# Patient Record
Sex: FEMALE | Race: WHITE | Employment: UNEMPLOYED | URBAN - METROPOLITAN AREA
[De-identification: names, ages, dates, MRNs, and addresses within clinical notes are randomized per-mention and may not be internally consistent; named-entity substitution may affect disease eponyms.]

---

## 2021-03-01 ENCOUNTER — HOSPITAL ENCOUNTER (INPATIENT)
Age: 61
LOS: 2 days | Discharge: HOME | DRG: 189 | End: 2021-03-03
Attending: EMERGENCY MEDICINE | Admitting: INTERNAL MEDICINE
Payer: COMMERCIAL

## 2021-03-01 ENCOUNTER — APPOINTMENT (OUTPATIENT)
Dept: GENERAL RADIOLOGY | Age: 61
DRG: 189 | End: 2021-03-01
Payer: COMMERCIAL

## 2021-03-01 DIAGNOSIS — J45.901 EXACERBATION OF ASTHMA, UNSPECIFIED ASTHMA SEVERITY, UNSPECIFIED WHETHER PERSISTENT: Primary | ICD-10-CM

## 2021-03-01 DIAGNOSIS — J96.01 ACUTE RESPIRATORY FAILURE WITH HYPOXIA (HCC): ICD-10-CM

## 2021-03-01 PROBLEM — I10 HTN (HYPERTENSION): Status: ACTIVE | Noted: 2021-03-01

## 2021-03-01 PROBLEM — F32.A DEPRESSION: Status: ACTIVE | Noted: 2021-03-01

## 2021-03-01 PROBLEM — J45.909 ASTHMA: Status: ACTIVE | Noted: 2021-03-01

## 2021-03-01 LAB
A/G RATIO: 1.7 (ref 1.1–2.2)
ALBUMIN SERPL-MCNC: 4.5 G/DL (ref 3.4–5)
ALP BLD-CCNC: 82 U/L (ref 40–129)
ALT SERPL-CCNC: 28 U/L (ref 10–40)
ANION GAP SERPL CALCULATED.3IONS-SCNC: 10 MMOL/L (ref 3–16)
AST SERPL-CCNC: 25 U/L (ref 15–37)
BASE EXCESS VENOUS: 1.6 MMOL/L (ref -3–3)
BASOPHILS ABSOLUTE: 0.1 K/UL (ref 0–0.2)
BASOPHILS RELATIVE PERCENT: 1.4 %
BILIRUB SERPL-MCNC: <0.2 MG/DL (ref 0–1)
BUN BLDV-MCNC: 17 MG/DL (ref 7–20)
CALCIUM SERPL-MCNC: 9.4 MG/DL (ref 8.3–10.6)
CARBOXYHEMOGLOBIN: 1.8 % (ref 0–1.5)
CHLORIDE BLD-SCNC: 103 MMOL/L (ref 99–110)
CO2: 28 MMOL/L (ref 21–32)
CREAT SERPL-MCNC: 0.7 MG/DL (ref 0.6–1.2)
EKG ATRIAL RATE: 97 BPM
EKG DIAGNOSIS: NORMAL
EKG P AXIS: 53 DEGREES
EKG P-R INTERVAL: 148 MS
EKG Q-T INTERVAL: 358 MS
EKG QRS DURATION: 90 MS
EKG QTC CALCULATION (BAZETT): 454 MS
EKG R AXIS: 3 DEGREES
EKG T AXIS: 27 DEGREES
EKG VENTRICULAR RATE: 97 BPM
EOSINOPHILS ABSOLUTE: 1.2 K/UL (ref 0–0.6)
EOSINOPHILS RELATIVE PERCENT: 16.2 %
GFR AFRICAN AMERICAN: >60
GFR NON-AFRICAN AMERICAN: >60
GLOBULIN: 2.7 G/DL
GLUCOSE BLD-MCNC: 109 MG/DL (ref 70–99)
HCO3 VENOUS: 27.4 MMOL/L (ref 23–29)
HCT VFR BLD CALC: 42.6 % (ref 36–48)
HEMOGLOBIN: 14.5 G/DL (ref 12–16)
LYMPHOCYTES ABSOLUTE: 2 K/UL (ref 1–5.1)
LYMPHOCYTES RELATIVE PERCENT: 26.5 %
MCH RBC QN AUTO: 31.5 PG (ref 26–34)
MCHC RBC AUTO-ENTMCNC: 34.1 G/DL (ref 31–36)
MCV RBC AUTO: 92.2 FL (ref 80–100)
METHEMOGLOBIN VENOUS: 0.5 %
MONOCYTES ABSOLUTE: 0.5 K/UL (ref 0–1.3)
MONOCYTES RELATIVE PERCENT: 7 %
NEUTROPHILS ABSOLUTE: 3.8 K/UL (ref 1.7–7.7)
NEUTROPHILS RELATIVE PERCENT: 48.9 %
O2 CONTENT, VEN: 20 VOL %
O2 SAT, VEN: 91 %
O2 THERAPY: ABNORMAL
PCO2, VEN: 47 MMHG (ref 40–50)
PDW BLD-RTO: 13.1 % (ref 12.4–15.4)
PH VENOUS: 7.38 (ref 7.35–7.45)
PLATELET # BLD: 295 K/UL (ref 135–450)
PMV BLD AUTO: 8 FL (ref 5–10.5)
PO2, VEN: 58.6 MMHG (ref 25–40)
POTASSIUM SERPL-SCNC: 3.7 MMOL/L (ref 3.5–5.1)
PRO-BNP: 154 PG/ML (ref 0–124)
RBC # BLD: 4.62 M/UL (ref 4–5.2)
SARS-COV-2, NAAT: NOT DETECTED
SODIUM BLD-SCNC: 141 MMOL/L (ref 136–145)
TCO2 CALC VENOUS: 29 MMOL/L
TOTAL PROTEIN: 7.2 G/DL (ref 6.4–8.2)
WBC # BLD: 7.7 K/UL (ref 4–11)

## 2021-03-01 PROCEDURE — 1200000000 HC SEMI PRIVATE

## 2021-03-01 PROCEDURE — 96374 THER/PROPH/DIAG INJ IV PUSH: CPT

## 2021-03-01 PROCEDURE — 6360000002 HC RX W HCPCS: Performed by: EMERGENCY MEDICINE

## 2021-03-01 PROCEDURE — 87040 BLOOD CULTURE FOR BACTERIA: CPT

## 2021-03-01 PROCEDURE — 82803 BLOOD GASES ANY COMBINATION: CPT

## 2021-03-01 PROCEDURE — 94150 VITAL CAPACITY TEST: CPT

## 2021-03-01 PROCEDURE — 36415 COLL VENOUS BLD VENIPUNCTURE: CPT

## 2021-03-01 PROCEDURE — 6370000000 HC RX 637 (ALT 250 FOR IP): Performed by: NURSE PRACTITIONER

## 2021-03-01 PROCEDURE — 83880 ASSAY OF NATRIURETIC PEPTIDE: CPT

## 2021-03-01 PROCEDURE — 2580000003 HC RX 258: Performed by: NURSE PRACTITIONER

## 2021-03-01 PROCEDURE — 93010 ELECTROCARDIOGRAM REPORT: CPT | Performed by: INTERNAL MEDICINE

## 2021-03-01 PROCEDURE — 80053 COMPREHEN METABOLIC PANEL: CPT

## 2021-03-01 PROCEDURE — 71045 X-RAY EXAM CHEST 1 VIEW: CPT

## 2021-03-01 PROCEDURE — 6360000002 HC RX W HCPCS: Performed by: NURSE PRACTITIONER

## 2021-03-01 PROCEDURE — 6370000000 HC RX 637 (ALT 250 FOR IP): Performed by: EMERGENCY MEDICINE

## 2021-03-01 PROCEDURE — 2580000003 HC RX 258: Performed by: EMERGENCY MEDICINE

## 2021-03-01 PROCEDURE — 94799 UNLISTED PULMONARY SVC/PX: CPT

## 2021-03-01 PROCEDURE — 94640 AIRWAY INHALATION TREATMENT: CPT

## 2021-03-01 PROCEDURE — 99285 EMERGENCY DEPT VISIT HI MDM: CPT

## 2021-03-01 PROCEDURE — 93005 ELECTROCARDIOGRAM TRACING: CPT | Performed by: EMERGENCY MEDICINE

## 2021-03-01 PROCEDURE — 85025 COMPLETE CBC W/AUTO DIFF WBC: CPT

## 2021-03-01 PROCEDURE — 87635 SARS-COV-2 COVID-19 AMP PRB: CPT

## 2021-03-01 RX ORDER — GUAIFENESIN 600 MG/1
600 TABLET, EXTENDED RELEASE ORAL 2 TIMES DAILY
Status: DISCONTINUED | OUTPATIENT
Start: 2021-03-01 | End: 2021-03-03 | Stop reason: HOSPADM

## 2021-03-01 RX ORDER — AZITHROMYCIN 250 MG/1
500 TABLET, FILM COATED ORAL DAILY
Status: COMPLETED | OUTPATIENT
Start: 2021-03-02 | End: 2021-03-02

## 2021-03-01 RX ORDER — FAMOTIDINE 20 MG/1
20 TABLET, FILM COATED ORAL 2 TIMES DAILY
Status: DISCONTINUED | OUTPATIENT
Start: 2021-03-01 | End: 2021-03-03 | Stop reason: HOSPADM

## 2021-03-01 RX ORDER — HYDROXYCHLOROQUINE SULFATE 200 MG/1
200 TABLET, FILM COATED ORAL 2 TIMES DAILY
COMMUNITY
Start: 2021-02-05

## 2021-03-01 RX ORDER — MAGNESIUM SULFATE IN WATER 40 MG/ML
2000 INJECTION, SOLUTION INTRAVENOUS ONCE
Status: COMPLETED | OUTPATIENT
Start: 2021-03-01 | End: 2021-03-01

## 2021-03-01 RX ORDER — MAGNESIUM SULFATE HEPTAHYDRATE 500 MG/ML
2000 INJECTION, SOLUTION INTRAMUSCULAR; INTRAVENOUS ONCE
Status: DISCONTINUED | OUTPATIENT
Start: 2021-03-01 | End: 2021-03-01

## 2021-03-01 RX ORDER — ACETAMINOPHEN 325 MG/1
650 TABLET ORAL EVERY 6 HOURS PRN
Status: DISCONTINUED | OUTPATIENT
Start: 2021-03-01 | End: 2021-03-03 | Stop reason: HOSPADM

## 2021-03-01 RX ORDER — IPRATROPIUM BROMIDE AND ALBUTEROL SULFATE 2.5; .5 MG/3ML; MG/3ML
1 SOLUTION RESPIRATORY (INHALATION) ONCE
Status: COMPLETED | OUTPATIENT
Start: 2021-03-01 | End: 2021-03-01

## 2021-03-01 RX ORDER — MONTELUKAST SODIUM 10 MG/1
10 TABLET ORAL NIGHTLY
Status: DISCONTINUED | OUTPATIENT
Start: 2021-03-01 | End: 2021-03-03 | Stop reason: HOSPADM

## 2021-03-01 RX ORDER — ALPRAZOLAM 0.5 MG/1
TABLET ORAL
COMMUNITY
Start: 2020-12-29

## 2021-03-01 RX ORDER — METHYLPREDNISOLONE SODIUM SUCCINATE 125 MG/2ML
125 INJECTION, POWDER, LYOPHILIZED, FOR SOLUTION INTRAMUSCULAR; INTRAVENOUS ONCE
Status: COMPLETED | OUTPATIENT
Start: 2021-03-01 | End: 2021-03-01

## 2021-03-01 RX ORDER — ONDANSETRON 2 MG/ML
4 INJECTION INTRAMUSCULAR; INTRAVENOUS EVERY 6 HOURS PRN
Status: DISCONTINUED | OUTPATIENT
Start: 2021-03-01 | End: 2021-03-03 | Stop reason: HOSPADM

## 2021-03-01 RX ORDER — FUROSEMIDE 20 MG/1
20 TABLET ORAL DAILY
Status: DISCONTINUED | OUTPATIENT
Start: 2021-03-01 | End: 2021-03-03 | Stop reason: HOSPADM

## 2021-03-01 RX ORDER — EPINEPHRINE 1 MG/ML
0.3 INJECTION, SOLUTION, CONCENTRATE INTRAVENOUS ONCE
Status: COMPLETED | OUTPATIENT
Start: 2021-03-01 | End: 2021-03-01

## 2021-03-01 RX ORDER — PROMETHAZINE HYDROCHLORIDE 25 MG/1
12.5 TABLET ORAL EVERY 6 HOURS PRN
Status: DISCONTINUED | OUTPATIENT
Start: 2021-03-01 | End: 2021-03-03 | Stop reason: HOSPADM

## 2021-03-01 RX ORDER — METHYLPREDNISOLONE SODIUM SUCCINATE 40 MG/ML
40 INJECTION, POWDER, LYOPHILIZED, FOR SOLUTION INTRAMUSCULAR; INTRAVENOUS EVERY 6 HOURS
Status: COMPLETED | OUTPATIENT
Start: 2021-03-01 | End: 2021-03-03

## 2021-03-01 RX ORDER — AZITHROMYCIN 250 MG/1
250 TABLET, FILM COATED ORAL DAILY
Status: DISCONTINUED | OUTPATIENT
Start: 2021-03-03 | End: 2021-03-03 | Stop reason: HOSPADM

## 2021-03-01 RX ORDER — IPRATROPIUM BROMIDE AND ALBUTEROL SULFATE 2.5; .5 MG/3ML; MG/3ML
1 SOLUTION RESPIRATORY (INHALATION) EVERY 4 HOURS
Status: DISCONTINUED | OUTPATIENT
Start: 2021-03-01 | End: 2021-03-03 | Stop reason: HOSPADM

## 2021-03-01 RX ORDER — ACETAMINOPHEN 650 MG/1
650 SUPPOSITORY RECTAL EVERY 6 HOURS PRN
Status: DISCONTINUED | OUTPATIENT
Start: 2021-03-01 | End: 2021-03-03 | Stop reason: HOSPADM

## 2021-03-01 RX ORDER — CETIRIZINE HYDROCHLORIDE 10 MG/1
10 TABLET ORAL DAILY
Status: DISCONTINUED | OUTPATIENT
Start: 2021-03-01 | End: 2021-03-03 | Stop reason: HOSPADM

## 2021-03-01 RX ORDER — SODIUM CHLORIDE 0.9 % (FLUSH) 0.9 %
10 SYRINGE (ML) INJECTION EVERY 12 HOURS SCHEDULED
Status: DISCONTINUED | OUTPATIENT
Start: 2021-03-01 | End: 2021-03-03 | Stop reason: HOSPADM

## 2021-03-01 RX ORDER — SODIUM CHLORIDE 0.9 % (FLUSH) 0.9 %
10 SYRINGE (ML) INJECTION PRN
Status: DISCONTINUED | OUTPATIENT
Start: 2021-03-01 | End: 2021-03-03 | Stop reason: HOSPADM

## 2021-03-01 RX ORDER — IPRATROPIUM BROMIDE AND ALBUTEROL SULFATE 2.5; .5 MG/3ML; MG/3ML
1 SOLUTION RESPIRATORY (INHALATION)
Status: DISCONTINUED | OUTPATIENT
Start: 2021-03-01 | End: 2021-03-01

## 2021-03-01 RX ORDER — PREDNISONE 20 MG/1
40 TABLET ORAL DAILY
Status: DISCONTINUED | OUTPATIENT
Start: 2021-03-04 | End: 2021-03-03 | Stop reason: HOSPADM

## 2021-03-01 RX ORDER — HYDROCODONE BITARTRATE AND ACETAMINOPHEN 5; 325 MG/1; MG/1
1 TABLET ORAL EVERY 6 HOURS PRN
Status: DISCONTINUED | OUTPATIENT
Start: 2021-03-01 | End: 2021-03-03 | Stop reason: HOSPADM

## 2021-03-01 RX ORDER — M-VIT,TX,IRON,MINS/CALC/FOLIC 27MG-0.4MG
1 TABLET ORAL DAILY
Status: DISCONTINUED | OUTPATIENT
Start: 2021-03-01 | End: 2021-03-03 | Stop reason: HOSPADM

## 2021-03-01 RX ORDER — POLYETHYLENE GLYCOL 3350 17 G/17G
17 POWDER, FOR SOLUTION ORAL DAILY PRN
Status: DISCONTINUED | OUTPATIENT
Start: 2021-03-01 | End: 2021-03-03 | Stop reason: HOSPADM

## 2021-03-01 RX ADMIN — ALBUTEROL SULFATE 2.5 MG: 2.5 SOLUTION RESPIRATORY (INHALATION) at 09:15

## 2021-03-01 RX ADMIN — CETIRIZINE HYDROCHLORIDE 10 MG: 10 TABLET, FILM COATED ORAL at 16:10

## 2021-03-01 RX ADMIN — Medication 10 ML: at 21:04

## 2021-03-01 RX ADMIN — Medication 1 TABLET: at 16:10

## 2021-03-01 RX ADMIN — METHYLPREDNISOLONE SODIUM SUCCINATE 40 MG: 40 INJECTION, POWDER, FOR SOLUTION INTRAMUSCULAR; INTRAVENOUS at 21:04

## 2021-03-01 RX ADMIN — METHYLPREDNISOLONE SODIUM SUCCINATE 125 MG: 125 INJECTION, POWDER, FOR SOLUTION INTRAMUSCULAR; INTRAVENOUS at 09:39

## 2021-03-01 RX ADMIN — METHYLPREDNISOLONE SODIUM SUCCINATE 40 MG: 40 INJECTION, POWDER, FOR SOLUTION INTRAMUSCULAR; INTRAVENOUS at 16:12

## 2021-03-01 RX ADMIN — MAGNESIUM SULFATE HEPTAHYDRATE 2000 MG: 40 INJECTION, SOLUTION INTRAVENOUS at 10:33

## 2021-03-01 RX ADMIN — FUROSEMIDE 20 MG: 20 TABLET ORAL at 16:10

## 2021-03-01 RX ADMIN — HYDROCODONE BITARTRATE AND ACETAMINOPHEN 1 TABLET: 5; 325 TABLET ORAL at 16:10

## 2021-03-01 RX ADMIN — ENOXAPARIN SODIUM 40 MG: 40 INJECTION SUBCUTANEOUS at 16:10

## 2021-03-01 RX ADMIN — AZITHROMYCIN MONOHYDRATE 500 MG: 500 INJECTION, POWDER, LYOPHILIZED, FOR SOLUTION INTRAVENOUS at 11:07

## 2021-03-01 RX ADMIN — IPRATROPIUM BROMIDE AND ALBUTEROL SULFATE 1 AMPULE: .5; 3 SOLUTION RESPIRATORY (INHALATION) at 09:15

## 2021-03-01 RX ADMIN — FAMOTIDINE 20 MG: 20 TABLET ORAL at 21:04

## 2021-03-01 RX ADMIN — IPRATROPIUM BROMIDE AND ALBUTEROL SULFATE 1 AMPULE: .5; 3 SOLUTION RESPIRATORY (INHALATION) at 16:30

## 2021-03-01 RX ADMIN — EPINEPHRINE 0.3 MG: 1 INJECTION, SOLUTION, CONCENTRATE INTRAVENOUS at 10:33

## 2021-03-01 RX ADMIN — IPRATROPIUM BROMIDE AND ALBUTEROL SULFATE 1 AMPULE: .5; 3 SOLUTION RESPIRATORY (INHALATION) at 20:22

## 2021-03-01 RX ADMIN — GUAIFENESIN 600 MG: 600 TABLET, EXTENDED RELEASE ORAL at 21:04

## 2021-03-01 RX ADMIN — MONTELUKAST SODIUM 10 MG: 10 TABLET ORAL at 21:04

## 2021-03-01 ASSESSMENT — PAIN DESCRIPTION - PROGRESSION: CLINICAL_PROGRESSION: NOT CHANGED

## 2021-03-01 ASSESSMENT — PAIN SCALES - GENERAL
PAINLEVEL_OUTOF10: 4
PAINLEVEL_OUTOF10: 4
PAINLEVEL_OUTOF10: 0

## 2021-03-01 ASSESSMENT — PAIN DESCRIPTION - DESCRIPTORS: DESCRIPTORS: CONSTANT;ACHING

## 2021-03-01 ASSESSMENT — PAIN DESCRIPTION - ONSET: ONSET: ON-GOING

## 2021-03-01 ASSESSMENT — PAIN DESCRIPTION - LOCATION: LOCATION: BACK;OTHER (COMMENT)

## 2021-03-01 ASSESSMENT — PAIN DESCRIPTION - PAIN TYPE: TYPE: CHRONIC PAIN

## 2021-03-01 NOTE — ED NOTES
Patient ambulatory to restroom, upon return to patient room O2 saturation 88% on room air and heart rate 109. After deep breaths, patients O2 saturation 94% on room air. Dr. Ramiro mireles.       Miguelina Cardoza RN  03/01/21 0657

## 2021-03-01 NOTE — PROGRESS NOTES
4 Eyes Skin Assessment     The patient is being assess for   Admission    I agree that 2 RN's have performed a thorough Head to Toe Skin Assessment on the patient. ALL assessment sites listed below have been assessed. Areas assessed by both nurses:   [x]   Head, Face, and Ears   [x]   Shoulders, Back, and Chest, Abdomen  [x]   Arms, Elbows, and Hands   [x]   Coccyx, Sacrum, and Ischium  [x]   Legs, Feet, and Heels        N/A    **SHARE this note so that the co-signing nurse is able to place an eSignature**    Co-signer eSignature: Electronically signed by Sabina Licona RN on 3/1/21 at 6:22 PM EST    Does the Patient have Skin Breakdown?   No          Lefty Prevention initiated:  NA   Wound Care Orders initiated:  NA      [facility] nurse consulted for Pressure Injury (Stage 3,4, Unstageable, DTI, NWPT, Complex wounds)and New or Established Ostomies:  NA      Primary Nurse eSignature: Electronically signed by Iker Roland RN on 3/1/21 at 4:47 PM EST

## 2021-03-01 NOTE — H&P
Hospital Medicine History & Physical      PCP: Magaly Yost MD    Date of Admission: 3/1/2021    Date of Service: Pt seen/examined on 3/1/2021 and Admitted to Inpatient with expected LOS greater than two midnights due to medical therapy. Chief Complaint:      Shortness of Breath (patient has history of Asthma, SOB started x3 days ago. Attempted breathing treatment and albuterol inhaler at home with little relief. Saw PCP this morning who sent patient to ED.)    History Of Present Illness:      61 y.o. female with hx of asthma/copd who presented to [facility] with above complaints. Pt was admitted with Asthma/copd exacerbation. She has been feeling poorly for a few days attempted to use nebulizer when to PCP who instructed to come to the ED. CXR none acute no leukocytosis. Past Medical History:          Diagnosis Date    asthma     Depression     better after divorce    Hypertension     Neuropathy     R hand       Past Surgical History:          Procedure Laterality Date    CERVICAL LAMINECTOMY      DIAGNOSTIC CARDIAC CATH LAB PROCEDURE         Medications Prior to Admission:      Prior to Admission medications    Medication Sig Start Date End Date Taking? Authorizing Provider   HYDROcodone-acetaminophen (NORCO) 5-325 MG per tablet Take 1 tablet by mouth every 6 hours as needed for Pain . Historical Provider, MD   moxifloxacin (AVELOX) 400 MG tablet Take 400 mg by mouth daily    Historical Provider, MD   predniSONE (DELTASONE) 1 MG tablet Take 1 mg by mouth daily Tapered dose, taking as directed on the box    Historical Provider, MD   furosemide (LASIX) 20 MG tablet Take 20 mg by mouth daily    Historical Provider, MD   fexofenadine (ALLEGRA) 180 MG tablet Take 180 mg by mouth daily. Historical Provider, MD   therapeutic multivitamin-minerals (THERAGRAN-M) tablet Take 1 tablet by mouth daily.       Historical Provider, MD montelukast (SINGULAIR) 10 MG tablet Take 10 mg by mouth nightly. Historical Provider, MD   Ipratropium-Albuterol (DUONEB IN) Inhale 1 vial into the lungs as needed. 7/16/10   Historical Provider, MD   albuterol (PROVENTIL HFA;VENTOLIN HFA) 108 (90 BASE) MCG/ACT inhaler Inhale 2 puffs into the lungs every 6 hours as needed. Historical Provider, MD   Budesonide-Formoterol Fumarate (SYMBICORT) 160-4.5 MCG/ACT AERO Inhale 2 puffs into the lungs 2 times daily. Historical Provider, MD       Allergies: Avelox [moxifloxacin hcl in nacl], Diovan [valsartan], Lisinopril, and Statins    Social History:      The patient currently lives independently     TOBACCO:   reports that she quit smoking about 12 years ago. Her smoking use included cigarettes. She has a 30.00 pack-year smoking history. She has never used smokeless tobacco.  ETOH:   reports current alcohol use. E-Cigarettes/Vaping Use     Questions Responses    E-Cigarette/Vaping Use     Start Date     Passive Exposure     Quit Date     Counseling Given     Comments             Family History:      Reviewed in detail and negative for DM, CAD, Cancer, CVA. Positive as follows:        Problem Relation Age of Onset    Cancer Father         lung ? asbestos - worked for [employer]    Emphysema Father     Asthma Sister     Asthma Brother        REVIEW OF SYSTEMS:   Pertinent positives as noted in the HPI. All other systems reviewed and negative. PHYSICAL EXAM PERFORMED:    BP (!) 165/92   Pulse 85   Temp 98.1 °F (36.7 °C) (Oral)   Resp 23   Ht 5' 3\" (1.6 m)   Wt 185 lb (83.9 kg)   LMP 08/25/2011   SpO2 92%   BMI 32.77 kg/m²     General appearance:  No apparent distress, appears stated age and cooperative. HEENT:  Normal cephalic, atraumatic without obvious deformity. Pupils equal, round, and reactive to light. Extra ocular muscles intact. Conjunctivae/corneas clear. Neck: Supple, with full range of motion. No jugular venous distention. Trachea midline. Respiratory: BAÑUELOS, bilaterally with wheezing   Cardiovascular:  Regular rate and rhythm with normal S1/S2 without murmurs, rubs or gallops. Abdomen: Soft, non-tender, non-distended with normal bowel sounds. Musculoskeletal:  No clubbing, cyanosis or edema bilaterally. Full range of motion without deformity. Skin: Skin color, texture, turgor normal.  No rashes or lesions. Neurologic:  Neurovascularly intact without any focal sensory/motor deficits. Cranial nerves: II-XII intact, grossly non-focal.  Psychiatric:  Alert and oriented, thought content appropriate, normal insight  Capillary Refill: Brisk,< 3 seconds   Peripheral Pulses: +2 palpable, equal bilaterally       Labs:     Recent Labs     03/01/21  0900   WBC 7.7   HGB 14.5   HCT 42.6        Recent Labs     03/01/21  0900      K 3.7      CO2 28   BUN 17   CREATININE 0.7   CALCIUM 9.4     Recent Labs     03/01/21  0900   AST 25   ALT 28   BILITOT <0.2   ALKPHOS 82     No results for input(s): INR in the last 72 hours. No results for input(s): Tiffanie Khushboo in the last 72 hours. Urinalysis:    No results found for: Marcaramis Ghee, BACTERIA, RBCUA, BLOODU, [address], [address]    Radiology:   EKG: Normal sinus rhythmNormal ECGConfirmed by OLESYA Lugo MD ([id number]) on 3/1/2021 1:15:52 PM     XR CHEST PORTABLE   Final Result   Mild bilateral lower lobe airspace opacity most likely atelectasis, less   likely pneumonia. No evidence of acute process otherwise.              ASSESSMENT:    Active Hospital Problems    Diagnosis Date Noted    Acute respiratory failure with hypoxia ([facility] [address] [J96.01] 03/01/2021    Asthma [J45.909] 03/01/2021    Depression [F32.9] 03/01/2021    HTN (hypertension) [I10] 03/01/2021    Asthma exacerbation attacks [J45.901] 03/01/2021         PLAN: Acute respiratory failure with hypoxia in the setting of COPD/asthma exacerbation  -Present on arrival with a 3 to 4-day history of worsening shortness of breath and overall feeling poorly, patient trialed use of at-home nebulizer treatments went and saw PCP on 3/1/2021 who encouraged her to come to the emergency department for further evaluation. On arrival oxygen saturation was 88% on room air and dwindled with activity.  -Chest x-ray without evidence of pneumonia or fluid overload, labs revealed no leukocytosis or electrolyte abnormalities. Patient was started on supplemental O2 to maintain oxygen saturations greater than 90% was started on azithromycin in the emergency department and was given IV Solu-Medrol.  -We will continue inhaled bronchodilators steroids with conversion to p.o. on day 2 and will continue azithromycin.   -Monitor oxygen saturations supplemental O2 wean as tolerated keep sats greater than 88%    Depressioncontrolled    Hypertensioncontrolled not on any oral medications      DVT Prophylaxis: Lovenox  Diet: No diet orders on file  Code Status: No Order    PT/OT Eval Status: NA    Dispo -likely home in 1 to 2 days       TROY Escamilla - CNP    Thank you Maricarmen Garza MD for the opportunity to be involved in this patient's care. If you have any questions or concerns please feel free to contact me at [phone].

## 2021-03-01 NOTE — PROGRESS NOTES
03/01/21 1641   Treatment   Treatment Type   (PEAK FLOW)   Patient Observation   Observations PRE-100 POST- 200

## 2021-03-01 NOTE — ED PROVIDER NOTES
[address] [place] [address]  ED    CHIEF COMPLAINT  Shortness of Breath (patient has history of Asthma, SOB started x3 days ago. Attempted breathing treatment and albuterol inhaler at home with little relief. Saw PCP this morning who sent patient to ED.)       HISTORY OF PRESENT ILLNESS  Jocelyn Hale is a 61 y.o. female who presents to the ED with complaint of SOB. Symptoms x3 days. Cough occasionally productive of yellow sputum. Wheezing. SOB, worse at night. Denies CP, nausea, vomiting, abdominal pain. Denies recent travel. No known COVID 19 exposure. Denies change in taste/smell. Tried albuterol inhalers without improvement. Former smoker, quit years ago, none recently. No other complaints, modifying factors or associated symptoms.      I have reviewed the following from the nursing documentation:    Past Medical History:   Diagnosis Date    asthma     Depression     better after divorce    Hypertension     Neuropathy     R hand     Past Surgical History:   Procedure Laterality Date    CERVICAL LAMINECTOMY      DIAGNOSTIC CARDIAC CATH LAB PROCEDURE       Family History   Problem Relation Age of Onset    Cancer Father         lung ? asbestos - worked for [employer]    Emphysema Father     Asthma Sister     Asthma Brother      Social History     Socioeconomic History    Marital status:      Spouse name: Not on file    Number of children: Not on file    Years of education: Not on file    Highest education level: Not on file   Occupational History    Occupation:      Employer: [id number]   Social Needs    Financial resource strain: Not on file    Food insecurity     Worry: Not on file     Inability: Not on file    Transportation needs     Medical: Not on file     Non-medical: Not on file   Tobacco Use    Smoking status: Former Smoker     Packs/day: 1.50     Years: 20.00     Pack years: 30.00     Types: Cigarettes     Quit date: 2008     Years since quittin.6  Smokeless tobacco: Never Used   Substance and Sexual Activity    Alcohol use: Yes     Comment: occasionally    Drug use: No    Sexual activity: Not on file   Lifestyle    Physical activity     Days per week: Not on file     Minutes per session: Not on file    Stress: Not on file   Relationships    Social connections     Talks on phone: Not on file     Gets together: Not on file     Attends Christianity service: Not on file     Active member of club or organization: Not on file     Attends meetings of clubs or organizations: Not on file     Relationship status: Not on file    Intimate partner violence     Fear of current or ex partner: Not on file     Emotionally abused: Not on file     Physically abused: Not on file     Forced sexual activity: Not on file   Other Topics Concern    Not on file   Social History Narrative    Not on file     Current Facility-Administered Medications   Medication Dose Route Frequency Provider Last Rate Last Admin    azithromycin (ZITHROMAX) 500 mg in D5W 250ml addavial  500 mg Intravenous Once Elvin Yousif MD        magnesium sulfate injection 2,000 mg  2,000 mg Intravenous Once Elvin Yousif MD        EPINEPHrine PF 1 MG/ML injection 0.3 mg  0.3 mg Intramuscular Once Elvin Yousif MD         Current Outpatient Medications   Medication Sig Dispense Refill    HYDROcodone-acetaminophen (NORCO) 5-325 MG per tablet Take 1 tablet by mouth every 6 hours as needed for Pain .  moxifloxacin (AVELOX) 400 MG tablet Take 400 mg by mouth daily      predniSONE (DELTASONE) 1 MG tablet Take 1 mg by mouth daily Tapered dose, taking as directed on the box      furosemide (LASIX) 20 MG tablet Take 20 mg by mouth daily      fexofenadine (ALLEGRA) 180 MG tablet Take 180 mg by mouth daily.  therapeutic multivitamin-minerals (THERAGRAN-M) tablet Take 1 tablet by mouth daily.  montelukast (SINGULAIR) 10 MG tablet Take 10 mg by mouth nightly.  hydrocodone-acetaminophen (VICODIN) 5-500 MG per tablet Take 1 tablet by mouth every 6 hours as needed.  Ipratropium-Albuterol (DUONEB IN) Inhale 1 vial into the lungs as needed.  albuterol (PROVENTIL HFA;VENTOLIN HFA) 108 (90 BASE) MCG/ACT inhaler Inhale 2 puffs into the lungs every 6 hours as needed.  Budesonide-Formoterol Fumarate (SYMBICORT) 160-4.5 MCG/ACT AERO Inhale 2 puffs into the lungs 2 times daily. Allergies   Allergen Reactions    Avelox [Moxifloxacin Hcl In Nacl]     Diovan [Valsartan]     Lisinopril      cough    Statins        REVIEW OF SYSTEMS  10 systems reviewed, pertinent positives and negatives per HPI, otherwise noted to be negative. PHYSICAL EXAM  ED Triage Vitals   Enc Vitals Group      BP 03/01/21 0926 (!) 154/82      Pulse 03/01/21 0926 89      Resp 03/01/21 0852 28      Temp 03/01/21 0926 98.1 °F (36.7 °C)      Temp Source 03/01/21 0852 Oral      SpO2 03/01/21 0926 100 %      Weight 03/01/21 0852 185 lb (83.9 kg)      Height 03/01/21 0852 5' 3\" (1.6 m)      Head Circumference --       Peak Flow --       Pain Score --       Pain Loc --       Pain Edu? --       Excl. in [address]? --      pearance: Awake and alert. Cooperative. No acute distress. HENT: Normocephalic. Atraumatic. Mucous membranes are moist.  Neck: Supple. No meningismus. Eyes: PERRL. EOMI. Heart/Chest: RRR. No murmurs. Lungs: Audible wheezing. Prolonged. Globally diminished. Speaking in full sentences. Abdomen: Soft. Non-tender. Non-distended. No rebound or guarding. Musculoskeletal: Trace symmetric LE edema. No deformity. No tenderness in the extremities. All extremities neurovascularly intact. Skin: Warm and dry. No acute rashes. Neurological: Alert and oriented. CN II-XII intact. Strength 5/5 bilateral upper and lower extremities. Sensation intact to light touch. Psychiatric: Mood/affect: normal      LABS  I have reviewed all labs for this visit. Results for orders placed or performed during the hospital encounter of 03/01/21   CBC Auto Differential   Result Value Ref Range    WBC 7.7 4.0 - 11.0 K/uL    RBC 4.62 4.00 - 5.20 M/uL    Hemoglobin 14.5 12.0 - 16.0 g/dL    Hematocrit 42.6 36.0 - 48.0 %    MCV 92.2 80.0 - 100.0 fL    MCH 31.5 26.0 - 34.0 pg    MCHC 34.1 31.0 - 36.0 g/dL    RDW 13.1 12.4 - 15.4 %    Platelets 844 387 - 660 K/uL    MPV 8.0 5.0 - 10.5 fL    Neutrophils % 48.9 %    Lymphocytes % 26.5 %    Monocytes % 7.0 %    Eosinophils % 16.2 %    Basophils % 1.4 %    Neutrophils Absolute 3.8 1.7 - 7.7 K/uL    Lymphocytes Absolute 2.0 1.0 - 5.1 K/uL    Monocytes Absolute 0.5 0.0 - 1.3 K/uL    Eosinophils Absolute 1.2 (H) 0.0 - 0.6 K/uL    Basophils Absolute 0.1 0.0 - 0.2 K/uL   Comprehensive metabolic panel   Result Value Ref Range    Sodium 141 136 - 145 mmol/L    Potassium 3.7 3.5 - 5.1 mmol/L    Chloride 103 99 - 110 mmol/L    CO2 28 21 - 32 mmol/L    Anion Gap 10 3 - 16    Glucose 109 (H) 70 - 99 mg/dL    BUN 17 7 - 20 mg/dL    CREATININE 0.7 0.6 - 1.2 mg/dL    GFR Non-African American >60 >60    GFR African American >60 >60    Calcium 9.4 8.3 - 10.6 mg/dL    Total Protein 7.2 6.4 - 8.2 g/dL    Albumin 4.5 3.4 - 5.0 g/dL    Albumin/Globulin Ratio 1.7 1.1 - 2.2    Total Bilirubin <0.2 0.0 - 1.0 mg/dL    Alkaline Phosphatase 82 40 - 129 U/L    ALT 28 10 - 40 U/L    AST 25 15 - 37 U/L    Globulin 2.7 g/dL   Blood gas, venous   Result Value Ref Range    pH, Andrea 7.383 7.350 - 7.450    pCO2, Andrea 47.0 40.0 - 50.0 mmHg    pO2, Andrea 58.6 (H) 25.0 - 40.0 mmHg    HCO3, Venous 27.4 23.0 - 29.0 mmol/L    Base Excess, Andrea 1.6 -3.0 - 3.0 mmol/L    O2 Sat, Andrea 91 Not Established %    Carboxyhemoglobin 1.8 (H) 0.0 - 1.5 %    MetHgb, Andrea 0.5 <1.5 %    TC02 (Calc), Andrea 29 Not Established mmol/L    O2 Content, Andrea 20 Not Established VOL %    O2 Therapy Unknown    Brain Natriuretic Peptide   Result Value Ref Range    Pro- (H) 0 - 124 pg/mL   EKG 12 Lead Result Value Ref Range    Ventricular Rate 97 BPM    Atrial Rate 97 BPM    P-R Interval 148 ms    QRS Duration 90 ms    Q-T Interval 358 ms    QTc Calculation (Bazett) 454 ms    P Axis 53 degrees    R Axis 3 degrees    T Axis 27 degrees    Diagnosis       Normal sinus rhythmNormal ECGWhen compared with ECG of 09-JAN-2009 11:15,QT has lengthened       RADIOLOGY  I have reviewed all radiographic studies for this visit. XR CHEST PORTABLE   Final Result   Mild bilateral lower lobe airspace opacity most likely atelectasis, less   likely pneumonia. No evidence of acute process otherwise. ECG  EKG interpreted by myself. Rate: normal  Rhythm: NSR  Axis: normal  Intervals: within normal limits  ST Segments: no acute abnormality  T waves: no acute abnormality  Comparison: no prior  Impression: NSR with no acute abnormality       ED COURSE/MDM  Patient seen and evaluated. Old records reviewed. Labs and imaging reviewed and results discussed with patient/family to extent possible. This is a 22-year-old female who presents with shortness of breath. On arrival the patient is afebrile. Tachypneic and slightly hypertensive with otherwise reassuring vital signs. Lung fields are globally diminished, prolonged, with audible wheezing. Chest x-ray with mild bilateral lower lobe airspace opacity favoring atelectasis, less likely pneumonia. Overall low suspicion for COVID-19 but will obtain a rapid Covid. Azithromycin administered primarily for anti-inflammatory benefit. Patient received DuoNeb and albuterol treatments, IV steroids, without significant improvement in her symptoms or exam.  Ambulated and was hypoxic. Will administer magnesium and IM epinephrine. Will admit to hospital medicine for further evaluation and treatment. EKG nonischemic. Renal panel no significant electrolyte abnormalities or creatinine elevation. CBC no leukocytosis or anemia. BMP is very slightly elevated at 154, clinically insignificant. Low suspicion for heart failure or ACS. VBG reassuring. During the patient's ED course, the patient was given:  Medications   azithromycin (ZITHROMAX) 500 mg in D5W 250ml addavial (has no administration in time range)   magnesium sulfate injection 2,000 mg (has no administration in time range)   EPINEPHrine PF 1 MG/ML injection 0.3 mg (has no administration in time range)   ipratropium-albuterol (DUONEB) nebulizer solution 1 ampule (1 ampule Inhalation Given 3/1/21 0915)   albuterol (PROVENTIL) nebulizer solution 2.5 mg (2.5 mg Nebulization Given 3/1/21 0915)   albuterol (PROVENTIL) nebulizer solution 2.5 mg (2.5 mg Nebulization Given 3/1/21 0915)   methylPREDNISolone sodium (SOLU-MEDROL) injection 125 mg (125 mg Intravenous Given 3/1/21 0939)        All questions were answered and the patient/family expressed understanding and agreement with the plan.      PROCEDURES  None    CRITICAL CARE Total critical care time provided today was at least 31 minutes. This excludes seperately billable procedures. Critical care time was provided for asthma versus COPD exacerbation requiring multiple back-to-back breathing treatments that required close evaluation and/or intervention with concern for potential patient decompensation. CLINICAL IMPRESSION  1. Exacerbation of asthma, unspecified asthma severity, unspecified whether persistent    2. Acute respiratory failure with hypoxia ([facility] [address]        DISPOSITION   admit    Janny Horvath MD    Note: This chart was created using voice recognition dictation software. Efforts were made by me to ensure accuracy, however some errors may be present due to limitations of this technology and occasionally words are not transcribed correctly.         Janny Horvath MD  03/01/21 9510

## 2021-03-01 NOTE — PROGRESS NOTES
RESPIRATORY THERAPY ASSESSMENT    Name:  [address] [place] [address] Record Number:  [id number]  Age: 61 y.o. Gender: female  : 1960  Today's Date:  3/1/2021  Room:  [facility]5012-50    Assessment     Is the patient being admitted for a COPD or Asthma exacerbation? Yes   (If yes the patient will be seen every 4 hours for the first 24 hours and then reassessed)    Patient Admission Diagnosis      Allergies  Allergies   Allergen Reactions    Avelox [Moxifloxacin Hcl In Nacl]     Diovan [Valsartan]     Lisinopril      cough    Statins        Minimum Predicted Vital Capacity:     750          Actual Vital Capacity:      500              Pulmonary History:COPD and Asthma  Home Oxygen Therapy:  room air  Home Respiratory Therapy:Albuterol/Ipratropium Bromide HHN and Budesonide/Formoterol    Current Respiratory Therapy:  duoneb   Treatment Type: HHN  Medications: Albuterol/Ipratropium    Respiratory Severity Index(RSI)   Patients with orders for inhalation medications, oxygen, or any therapeutic treatment modality will be placed on Respiratory Protocol. They will be assessed with the first treatment and at least every 72 hours thereafter. The following severity scale will be used to determine frequency of treatment intervention.     Smoking History: Pulmonary Disease or Smoking History, Greater than 15 pack year = 2    Social History  Social History     Tobacco Use    Smoking status: Former Smoker     Packs/day: 1.50     Years: 20.00     Pack years: 30.00     Types: Cigarettes     Quit date: 2008     Years since quittin.6    Smokeless tobacco: Never Used   Substance Use Topics    Alcohol use: Yes     Comment: occasionally    Drug use: No       Recent Surgical History: None = 0  Past Surgical History  Past Surgical History:   Procedure Laterality Date    CERVICAL LAMINECTOMY      DIAGNOSTIC CARDIAC CATH LAB PROCEDURE         Level of Consciousness: Alert, Oriented, and Cooperative = 0 Level of Activity: 1    Respiratory Pattern: Regular Pattern; RR 8-20 = 0    Breath Sounds: Absent bilaterally and/or with wheezes = 3    Sputum   ,  ,    Cough: Strong, spontaneous, non-productive = 0    Vital Signs   BP (!) 178/100   Pulse 95   Temp 98 °F (36.7 °C) (Oral)   Resp 18   Ht 5' 3\" (1.6 m)   Wt 185 lb (83.9 kg)   LMP 08/25/2011   SpO2 92%   BMI 32.77 kg/m²   SPO2 (COPD values may differ): Greater than or equal to 92% on room air = 0    Peak Flow (asthma only): not applicable = 0    RSI: 5-6 = Q4hr PRN (every four hours as needed) for dyspnea        Plan       Goals: medication delivery, mobilize retained secretions, volume expansion and improve oxygenation    Patient/caregiver was educated on the proper method of use for Respiratory Care Devices:  Yes      Level of patient/caregiver understanding able to:   ? Verbalize understanding   ? Demonstrate understanding       ? Teach back        ? Needs reinforcement       ? No available caregiver               ? Other:     Response to education:  Excellent     Is patient being placed on Home Treatment Regimen? No     Does the patient have everything they need prior to discharge? NA     Comments: pt wheezing    Plan of Care: Q4 TREATMENTS    Electronically signed by Annie Menendez RCP on 3/1/2021 at 4:34 PM    Respiratory Protocol Guidelines     1. Assessment and treatment by Respiratory Therapy will be initiated for medication and therapeutic interventions upon initiation of aerosolized medication. 2. Physician will be contacted for respiratory rate (RR) greater than 35 breaths per minute. Therapy will be held for heart rate (HR) greater than 140 beats per minute, pending direction from physician. 3. Bronchodilators will be administered via Metered Dose Inhaler (MDI) with spacer when the following criteria are met:  a.  Alert and cooperative     b. HR < 140 bpm  c. RR < 30 bpm                d. Can demonstrate a 23 second inspiratory hold 4. Bronchodilators will be administered via Hand Held Nebulizer DEVON [facility]) to patients when ANY of the following criteria are met  a. Incognizant or uncooperative          b. Patients treated with HHN at Home        c. Unable to demonstrate proper use of MDI with spacer     d. RR > 30 bpm   5. Bronchodilators will be delivered via Metered Dose Inhaler (MDI), HHN, Aerogen to intubated patients on mechanical ventilation. 6. Inhalation medication orders will be delivered and/or substituted as outlined below. Aerosolized Medications Ordering and Administration Guidelines:    1. All Medications will be ordered by a physician, and their frequency and/or modality will be adjusted as defined by the patients Respiratory Severity Index (RSI) score. 2. If the patient does not have documented COPD, consider discontinuing anticholinergics when RSI is less than 9.  3. If the bronchospasm worsens (increased RSI), then the bronchodilator frequency can be increased to a maximum of every 4 hours. If greater than every 4 hours is required, the physician will be contacted. 4. If the bronchospasm improves, the frequency of the bronchodilator can be decreased, based on the patient's RSI, but not less than home treatment regimen frequency. 5. Bronchodilator(s) will be discontinued if patient has a RSI less than 9 and has received no scheduled or as needed treatment for 72  Hrs. Patients Ordered on a Mucolytic Agent:    1. Must always be administered with a bronchodilator. 2. Discontinue if patient experiences worsened bronchospasm, or secretions have lessened to the point that the patient is able to clear them with a cough. Anti-inflammatory and Combination Medications:    1. If the patient lacks prior history of lung disease, is not using inhaled anti-inflammatory medication at home, and lacks wheezing by examination or by history for at least 24 hours, contact physician for possible discontinuation.

## 2021-03-02 PROCEDURE — 94640 AIRWAY INHALATION TREATMENT: CPT

## 2021-03-02 PROCEDURE — 94799 UNLISTED PULMONARY SVC/PX: CPT

## 2021-03-02 PROCEDURE — 2580000003 HC RX 258: Performed by: NURSE PRACTITIONER

## 2021-03-02 PROCEDURE — 6370000000 HC RX 637 (ALT 250 FOR IP): Performed by: NURSE PRACTITIONER

## 2021-03-02 PROCEDURE — 94669 MECHANICAL CHEST WALL OSCILL: CPT

## 2021-03-02 PROCEDURE — 1200000000 HC SEMI PRIVATE

## 2021-03-02 PROCEDURE — 6360000002 HC RX W HCPCS: Performed by: NURSE PRACTITIONER

## 2021-03-02 RX ORDER — HYDRALAZINE HYDROCHLORIDE 20 MG/ML
10 INJECTION INTRAMUSCULAR; INTRAVENOUS EVERY 6 HOURS PRN
Status: DISCONTINUED | OUTPATIENT
Start: 2021-03-02 | End: 2021-03-03 | Stop reason: HOSPADM

## 2021-03-02 RX ADMIN — CETIRIZINE HYDROCHLORIDE 10 MG: 10 TABLET, FILM COATED ORAL at 09:17

## 2021-03-02 RX ADMIN — MONTELUKAST SODIUM 10 MG: 10 TABLET ORAL at 21:17

## 2021-03-02 RX ADMIN — HYDRALAZINE HYDROCHLORIDE 10 MG: 20 INJECTION INTRAMUSCULAR; INTRAVENOUS at 19:49

## 2021-03-02 RX ADMIN — HYDROCODONE BITARTRATE AND ACETAMINOPHEN 1 TABLET: 5; 325 TABLET ORAL at 15:50

## 2021-03-02 RX ADMIN — ENOXAPARIN SODIUM 40 MG: 40 INJECTION SUBCUTANEOUS at 09:17

## 2021-03-02 RX ADMIN — METHYLPREDNISOLONE SODIUM SUCCINATE 40 MG: 40 INJECTION, POWDER, FOR SOLUTION INTRAMUSCULAR; INTRAVENOUS at 04:00

## 2021-03-02 RX ADMIN — FAMOTIDINE 20 MG: 20 TABLET ORAL at 21:16

## 2021-03-02 RX ADMIN — IPRATROPIUM BROMIDE AND ALBUTEROL SULFATE 1 AMPULE: .5; 3 SOLUTION RESPIRATORY (INHALATION) at 04:36

## 2021-03-02 RX ADMIN — HYDROCODONE BITARTRATE AND ACETAMINOPHEN 1 TABLET: 5; 325 TABLET ORAL at 01:03

## 2021-03-02 RX ADMIN — FUROSEMIDE 20 MG: 20 TABLET ORAL at 09:17

## 2021-03-02 RX ADMIN — FAMOTIDINE 20 MG: 20 TABLET ORAL at 09:17

## 2021-03-02 RX ADMIN — METHYLPREDNISOLONE SODIUM SUCCINATE 40 MG: 40 INJECTION, POWDER, FOR SOLUTION INTRAMUSCULAR; INTRAVENOUS at 09:18

## 2021-03-02 RX ADMIN — IPRATROPIUM BROMIDE AND ALBUTEROL SULFATE 1 AMPULE: .5; 3 SOLUTION RESPIRATORY (INHALATION) at 00:26

## 2021-03-02 RX ADMIN — IPRATROPIUM BROMIDE AND ALBUTEROL SULFATE 1 AMPULE: .5; 3 SOLUTION RESPIRATORY (INHALATION) at 20:30

## 2021-03-02 RX ADMIN — IPRATROPIUM BROMIDE AND ALBUTEROL SULFATE 1 AMPULE: .5; 3 SOLUTION RESPIRATORY (INHALATION) at 23:55

## 2021-03-02 RX ADMIN — IPRATROPIUM BROMIDE AND ALBUTEROL SULFATE 1 AMPULE: .5; 3 SOLUTION RESPIRATORY (INHALATION) at 08:11

## 2021-03-02 RX ADMIN — GUAIFENESIN 600 MG: 600 TABLET, EXTENDED RELEASE ORAL at 21:17

## 2021-03-02 RX ADMIN — METHYLPREDNISOLONE SODIUM SUCCINATE 40 MG: 40 INJECTION, POWDER, FOR SOLUTION INTRAMUSCULAR; INTRAVENOUS at 21:17

## 2021-03-02 RX ADMIN — GUAIFENESIN 600 MG: 600 TABLET, EXTENDED RELEASE ORAL at 09:17

## 2021-03-02 RX ADMIN — AZITHROMYCIN MONOHYDRATE 500 MG: 250 TABLET ORAL at 09:18

## 2021-03-02 RX ADMIN — HYDRALAZINE HYDROCHLORIDE 10 MG: 20 INJECTION INTRAMUSCULAR; INTRAVENOUS at 09:26

## 2021-03-02 RX ADMIN — Medication 1 TABLET: at 09:17

## 2021-03-02 RX ADMIN — METHYLPREDNISOLONE SODIUM SUCCINATE 40 MG: 40 INJECTION, POWDER, FOR SOLUTION INTRAMUSCULAR; INTRAVENOUS at 15:43

## 2021-03-02 RX ADMIN — HYDROCODONE BITARTRATE AND ACETAMINOPHEN 1 TABLET: 5; 325 TABLET ORAL at 09:26

## 2021-03-02 RX ADMIN — IPRATROPIUM BROMIDE AND ALBUTEROL SULFATE 1 AMPULE: .5; 3 SOLUTION RESPIRATORY (INHALATION) at 16:46

## 2021-03-02 RX ADMIN — Medication 10 ML: at 09:19

## 2021-03-02 RX ADMIN — Medication 10 ML: at 21:22

## 2021-03-02 RX ADMIN — IPRATROPIUM BROMIDE AND ALBUTEROL SULFATE 1 AMPULE: .5; 3 SOLUTION RESPIRATORY (INHALATION) at 11:41

## 2021-03-02 ASSESSMENT — PULMONARY FUNCTION TESTS
PEFR_L/MIN: 200
PEFR_L/MIN: 250
PEFR_L/MIN: 200

## 2021-03-02 ASSESSMENT — PAIN SCALES - GENERAL
PAINLEVEL_OUTOF10: 5
PAINLEVEL_OUTOF10: 9
PAINLEVEL_OUTOF10: 4
PAINLEVEL_OUTOF10: 6
PAINLEVEL_OUTOF10: 0

## 2021-03-02 NOTE — PROGRESS NOTES
Lungs sounding better than yesterday. Breathing no longer audible when pt is at rest. Dyspnea with ambulation, lessened from yesterday per pt. Still has complaint of congestion in chest, but states overall feeling better.

## 2021-03-02 NOTE — CARE COORDINATION
Per chart review and discussion with primary nurse at 1100 huddle, pt will not have needs at discharge. IPTA. Please consult CM team if needs arise.

## 2021-03-03 VITALS
SYSTOLIC BLOOD PRESSURE: 159 MMHG | RESPIRATION RATE: 22 BRPM | OXYGEN SATURATION: 92 % | WEIGHT: 185 LBS | HEIGHT: 63 IN | TEMPERATURE: 98.2 F | DIASTOLIC BLOOD PRESSURE: 95 MMHG | HEART RATE: 102 BPM | BODY MASS INDEX: 32.78 KG/M2

## 2021-03-03 PROCEDURE — 6370000000 HC RX 637 (ALT 250 FOR IP): Performed by: NURSE PRACTITIONER

## 2021-03-03 PROCEDURE — 6360000002 HC RX W HCPCS: Performed by: NURSE PRACTITIONER

## 2021-03-03 PROCEDURE — 94640 AIRWAY INHALATION TREATMENT: CPT

## 2021-03-03 PROCEDURE — 94669 MECHANICAL CHEST WALL OSCILL: CPT

## 2021-03-03 PROCEDURE — 2580000003 HC RX 258: Performed by: NURSE PRACTITIONER

## 2021-03-03 RX ORDER — AZITHROMYCIN 250 MG/1
250 TABLET, FILM COATED ORAL DAILY
Qty: 10 TABLET | Refills: 0 | Status: SHIPPED | OUTPATIENT
Start: 2021-03-04 | End: 2021-03-14

## 2021-03-03 RX ORDER — MECOBALAMIN 5000 MCG
5 TABLET,DISINTEGRATING ORAL NIGHTLY
Status: DISCONTINUED | OUTPATIENT
Start: 2021-03-03 | End: 2021-03-03 | Stop reason: HOSPADM

## 2021-03-03 RX ORDER — FAMOTIDINE 20 MG/1
20 TABLET, FILM COATED ORAL 2 TIMES DAILY
Qty: 20 TABLET | Refills: 0 | Status: SHIPPED | OUTPATIENT
Start: 2021-03-03

## 2021-03-03 RX ORDER — PREDNISONE 10 MG/1
TABLET ORAL
Qty: 30 TABLET | Refills: 0 | Status: SHIPPED | OUTPATIENT
Start: 2021-03-03

## 2021-03-03 RX ORDER — GUAIFENESIN 600 MG/1
600 TABLET, EXTENDED RELEASE ORAL 2 TIMES DAILY
Qty: 60 TABLET | Refills: 0 | Status: SHIPPED | OUTPATIENT
Start: 2021-03-03

## 2021-03-03 RX ADMIN — Medication 10 ML: at 08:30

## 2021-03-03 RX ADMIN — IPRATROPIUM BROMIDE AND ALBUTEROL SULFATE 1 AMPULE: .5; 3 SOLUTION RESPIRATORY (INHALATION) at 08:13

## 2021-03-03 RX ADMIN — IPRATROPIUM BROMIDE AND ALBUTEROL SULFATE 1 AMPULE: .5; 3 SOLUTION RESPIRATORY (INHALATION) at 15:35

## 2021-03-03 RX ADMIN — METHYLPREDNISOLONE SODIUM SUCCINATE 40 MG: 40 INJECTION, POWDER, FOR SOLUTION INTRAMUSCULAR; INTRAVENOUS at 08:29

## 2021-03-03 RX ADMIN — FUROSEMIDE 20 MG: 20 TABLET ORAL at 08:29

## 2021-03-03 RX ADMIN — ENOXAPARIN SODIUM 40 MG: 40 INJECTION SUBCUTANEOUS at 08:29

## 2021-03-03 RX ADMIN — IPRATROPIUM BROMIDE AND ALBUTEROL SULFATE 1 AMPULE: .5; 3 SOLUTION RESPIRATORY (INHALATION) at 12:06

## 2021-03-03 RX ADMIN — Medication 1 TABLET: at 08:29

## 2021-03-03 RX ADMIN — METHYLPREDNISOLONE SODIUM SUCCINATE 40 MG: 40 INJECTION, POWDER, FOR SOLUTION INTRAMUSCULAR; INTRAVENOUS at 02:42

## 2021-03-03 RX ADMIN — CETIRIZINE HYDROCHLORIDE 10 MG: 10 TABLET, FILM COATED ORAL at 08:29

## 2021-03-03 RX ADMIN — IPRATROPIUM BROMIDE AND ALBUTEROL SULFATE 1 AMPULE: .5; 3 SOLUTION RESPIRATORY (INHALATION) at 03:52

## 2021-03-03 RX ADMIN — GUAIFENESIN 600 MG: 600 TABLET, EXTENDED RELEASE ORAL at 08:29

## 2021-03-03 RX ADMIN — Medication 5 MG: at 01:14

## 2021-03-03 RX ADMIN — FAMOTIDINE 20 MG: 20 TABLET ORAL at 08:29

## 2021-03-03 RX ADMIN — HYDROCODONE BITARTRATE AND ACETAMINOPHEN 1 TABLET: 5; 325 TABLET ORAL at 08:35

## 2021-03-03 RX ADMIN — AZITHROMYCIN MONOHYDRATE 250 MG: 250 TABLET ORAL at 08:29

## 2021-03-03 ASSESSMENT — PAIN SCALES - GENERAL: PAINLEVEL_OUTOF10: 0

## 2021-03-03 NOTE — FLOWSHEET NOTE
Spiritual support visit. Patient was coughing a lot. She was happy to tell me about her granddaughter and how well she can cook. :Looking forward to going home. We prayed together. Thank me for seeing her. 03/03/21 1507   Encounter Summary   Services provided to: Patient   Referral/Consult From: [facility] Family members   Continue Visiting   (3/3/21/Spiritual support visit. Prayed together.)   Complexity of Encounter Low   Length of Encounter 15 minutes   Spiritual/Oriental orthodox   Type Spiritual support   Assessment Approachable; Hopeful;Coping   Intervention Active listening;Nurtured hope;Prayer;Scripture;Sustaining presence/ Ministry of presence   Outcome Expressed gratitude;Engaged in conversation

## 2021-03-03 NOTE — PROGRESS NOTES
Discharge orders placed by MD, verified by RN prior to discharge.  denies any discharge needs for the patient. Discharge paperwork complete, reviewed, and signed with the patient. A copy of all paperwork provided. All questions and concerns resolved at the time of review. Patient verbalized understanding of paperwork. Prescriptions sent to the pharmacy. RN removed IV prior to discharge.  Patient discharged to home with spouse, ambulated independently out of the hospital.

## 2021-03-03 NOTE — ADT AUTH CERT
Asthma - Care Day 2 (3/2/2021) by Jones Roche RN       Review Status Review Entered   Completed 3/3/2021 15:14      Criteria Review      Care Day: 2 Care Date: 3/2/2021 Level of Care: Inpatient Floor    Guideline Day 2    Level Of Care    (X) Floor to discharge    3/3/2021 3:14 PM EST by Reola Signs      MS    Clinical Status    (X) * No requirement for assisted ventilation    ( ) * Hemodynamic stability    3/3/2021 3:14 PM EST by Reola Signs      HR     (X) * Infection absent or outpatient treatment planned    ( ) * Airflow rates greater than or equal to 60% of baseline or predicted    ( ) * Tachypnea absent    3/3/2021 3:14 PM EST by Reola Signs      RR 18-24    (X) * Hypoxemia absent    3/3/2021 3:14 PM EST by Reola Signs      RA    ( ) * Breathing without retractions or accessory muscle use    3/3/2021 3:14 PM EST by Reola Signs      Conversational dyspnea, bilat wheeze and rhonchi- actually improving. 2 liters per NC    ( ) * Beta-agonist treatment not needed or response sustained for at least 4 hours    ( ) * Discharge plans and education understood    Activity    (X) * Ambulatory or acceptable for next level of care    3/3/2021 3:14 PM EST by Reola Signs      up as kannan    Routes    (X) * Oral hydration, medications, and diet    3/3/2021 3:14 PM EST by Reola Signs      general diet    Interventions    ( ) * Oxygen absent or at baseline    3/3/2021 3:14 PM EST by Reola Signs      Conversational dyspnea, bilat wheeze and rhonchi- actually improving.  2 liters per NC    (X) Pulse oximetry    3/3/2021 3:14 PM EST by Reola Signs      92-95%    * Milestone   Additional Notes   3/2      IM   No acute events overnight, feeling improved but still BAÑUELOS.        BP (!) 172/85   Pulse 106   Temp 97.7 °F (36.5 °C) (Oral)   Resp 22   Ht 5' 3\" (1.6 m)   Wt 185 lb (83.9 kg)   LMP 08/25/2011   SpO2 96%   BMI 32.77 kg/m²      General appearance: No apparent distress, appears stated age and cooperative. HEENT: Pupils equal, round, and reactive to light. Conjunctivae/corneas clear. Neck: Supple, with full range of motion. No jugular venous distention. Trachea midline. Respiratory: Conversational dyspnea, bilat wheeze and rhonchi- actually improving. 2 liters per NC    Cardiovascular: Regular rate and rhythm with normal S1/S2 without murmurs, rubs or gallops. Abdomen: Soft, non-tender, non-distended with normal bowel sounds. Musculoskeletal: No clubbing, cyanosis or edema bilaterally.  Full range of motion without deformity. Skin: Skin color, texture, turgor normal.  No rashes or lesions. Neurologic:  Neurovascularly intact without any focal sensory/motor deficits. Cranial nerves: II-XII intact, grossly non-focal.   Psychiatric: Alert and oriented, thought content appropriate, normal insight   Capillary Refill: Brisk,< 3 seconds    Peripheral Pulses: +2 palpable, equal bilaterally           Assessment/Plan:       Active Hospital Problems     Diagnosis   · Acute respiratory failure with hypoxia (HCC) [J96.01]   · Asthma [J45.909]   · Depression [F32.9]   · HTN (hypertension) [I10]   · Asthma exacerbation attacks [J45.901]       Acute respiratory failure with hypoxia in the setting of COPD/asthma exacerbation   -Present on arrival with a 3 to 4-day history of worsening shortness of breath and overall feeling poorly, patient trialed use of at-home nebulizer treatments went and saw PCP on 3/1/2021 who encouraged her to come to the emergency department for further evaluation.  On arrival oxygen saturation was 88% on room air and dwindled with activity. -Chest x-ray without evidence of pneumonia or fluid overload, labs revealed no leukocytosis or electrolyte abnormalities.  Patient was started on supplemental O2 to maintain oxygen saturations greater than 90% was started on azithromycin in the emergency department and was given IV Solu-Medrol.   -We will continue inhaled bronchodilators steroids with conversion to p.o. on day 2 and will continue azithromycin.    -Monitor oxygen saturations supplemental O2 wean as tolerated keep sats greater than 88%       Depression-controlled       Hypertension-controlled not on any oral medications        DVT Prophylaxis: Lovenox   Diet: No diet orders on file   Code Status: No Order       PT/OT Eval Status: NA       Dispo -likely home in 1 to 2 days   -----------------   Scheduled Medications   · cetirizine 10 mg Oral Daily   · furosemide 20 mg Oral Daily   · montelukast 10 mg Oral Nightly   · therapeutic multivitamin-minerals 1 tablet Oral Daily   · sodium chloride flush 10 mL Intravenous 2 times per day   · enoxaparin 40 mg Subcutaneous Daily   · famotidine 20 mg Oral BID   · methylPREDNISolone 40 mg Intravenous Q6H     Followed by   · [START ON 3/4/2021] predniSONE 40 mg Oral Daily   · [START ON 3/3/2021] azithromycin 250 mg Oral Daily   · guaiFENesin 600 mg Oral BID   · ipratropium-albuterol 1 ampule Inhalation Q4H      azithromycin (ZITHROMAX) tablet 500 mg x1         PRN   hydrALAZINE (APRESOLINE) injection 10 mg x2   HYDROcodone-acetaminophen (NORCO) 5-325 MG per tablet 1 tablet x3

## 2021-03-03 NOTE — PLAN OF CARE
Patient will remain free from falls this shift. Call light within reach, bedside table within reach, bed in lowest position, 2/4 rails raised, bed in locked position, phone within patient's reach, non-skid socks on. Patient instructed to call out if she needs to get up or needs any assistance, RN instructed patient not to ambulate without assistance. Patient verbalized understanding. RN will continue to monitor.

## 2021-03-03 NOTE — DISCHARGE SUMMARY
Take 1 tablet by mouth daily. Time Spent on discharge is more than 30 minutes in the examination, evaluation, counseling and review of medications and discharge plan. Signed:    TROY Stack CNP   3/3/2021      Thank you Shalonda Michel MD for the opportunity to be involved in this patient's care. If you have any questions or concerns please feel free to contact me at [phone].

## 2021-03-03 NOTE — PROGRESS NOTES
Hospitalist Progress Note      PCP: Duke Neumann MD    Date of Admission: 3/1/2021    Chief Complaint: Shortness of Breath (patient has history of Asthma, SOB started x3 days ago. Attempted breathing treatment and albuterol inhaler at home with little relief. Saw PCP this morning who sent patient to ED.)       Hospital Course: [de-identified] y.o. female with hx of asthma/copd who presented to [facility] with above complaints. Pt was admitted with Asthma/copd exacerbation. She has been feeling poorly for a few days attempted to use nebulizer when to PCP who instructed to come to the ED. CXR none acute no leukocytosis    Subjective: No acute events overnight, feeling improved but still BAÑUELOS. Medications:  Reviewed    Infusion Medications   Scheduled Medications    cetirizine  10 mg Oral Daily    furosemide  20 mg Oral Daily    montelukast  10 mg Oral Nightly    therapeutic multivitamin-minerals  1 tablet Oral Daily    sodium chloride flush  10 mL Intravenous 2 times per day    enoxaparin  40 mg Subcutaneous Daily    famotidine  20 mg Oral BID    methylPREDNISolone  40 mg Intravenous Q6H    Followed by   Sally Wills ON 3/4/2021] predniSONE  40 mg Oral Daily    [START ON 3/3/2021] azithromycin  250 mg Oral Daily    guaiFENesin  600 mg Oral BID    ipratropium-albuterol  1 ampule Inhalation Q4H     PRN Meds: hydrALAZINE, HYDROcodone-acetaminophen, sodium chloride flush, promethazine **OR** ondansetron, polyethylene glycol, acetaminophen **OR** acetaminophen      Intake/Output Summary (Last 24 hours) at 3/2/2021 1916  Last data filed at 3/2/2021 1006  Gross per 24 hour   Intake 490 ml   Output    Net 490 ml       Physical Exam Performed:    BP (!) 172/85   Pulse 106   Temp 97.7 °F (36.5 °C) (Oral)   Resp 22   Ht 5' 3\" (1.6 m)   Wt 185 lb (83.9 kg)   LMP 08/25/2011   SpO2 96%   BMI 32.77 kg/m²     General appearance: No apparent distress, appears stated age and cooperative. HEENT: Pupils equal, round, and reactive to light. Conjunctivae/corneas clear. Neck: Supple, with full range of motion. No jugular venous distention. Trachea midline. Respiratory: Conversational dyspnea, bilat wheeze and rhonchi- actually improving. 2 liters per NC   Cardiovascular: Regular rate and rhythm with normal S1/S2 without murmurs, rubs or gallops. Abdomen: Soft, non-tender, non-distended with normal bowel sounds. Musculoskeletal: No clubbing, cyanosis or edema bilaterally. Full range of motion without deformity. Skin: Skin color, texture, turgor normal.  No rashes or lesions. Neurologic:  Neurovascularly intact without any focal sensory/motor deficits. Cranial nerves: II-XII intact, grossly non-focal.  Psychiatric: Alert and oriented, thought content appropriate, normal insight  Capillary Refill: Brisk,< 3 seconds   Peripheral Pulses: +2 palpable, equal bilaterally       Labs:   Recent Labs     03/01/21  0900   WBC 7.7   HGB 14.5   HCT 42.6        Recent Labs     03/01/21  0900      K 3.7      CO2 28   BUN 17   CREATININE 0.7   CALCIUM 9.4     Recent Labs     03/01/21  0900   AST 25   ALT 28   BILITOT <0.2   ALKPHOS 82     No results for input(s): INR in the last 72 hours. No results for input(s): Ja Huber in the last 72 hours. Urinalysis:    No results found for: Sherlie Chris, BACTERIA, RBCUA, BLOODU, [address], [address]    Radiology:  XR CHEST PORTABLE   Final Result   Mild bilateral lower lobe airspace opacity most likely atelectasis, less   likely pneumonia. No evidence of acute process otherwise.                  Assessment/Plan:    Active Hospital Problems    Diagnosis    Acute respiratory failure with hypoxia (HCC) [J96.01]    Asthma [J45.909]    Depression [F32.9]    HTN (hypertension) [I10]    Asthma exacerbation attacks [J45.901]     Acute respiratory failure with hypoxia in the setting of COPD/asthma exacerbation

## 2021-03-05 LAB — BLOOD CULTURE, ROUTINE: NORMAL

## 2023-11-22 ENCOUNTER — HOSPITAL ENCOUNTER (OUTPATIENT)
Dept: PULMONOLOGY | Age: 63
Discharge: HOME | End: 2023-11-22
Payer: COMMERCIAL

## 2023-11-22 DIAGNOSIS — J45.50 SEVERE PERSISTENT ASTHMA, UNSPECIFIED WHETHER COMPLICATED: ICD-10-CM

## 2023-11-22 PROCEDURE — 94726 PLETHYSMOGRAPHY LUNG VOLUMES: CPT

## 2023-11-22 PROCEDURE — 94729 DIFFUSING CAPACITY: CPT

## 2023-11-22 PROCEDURE — 94760 N-INVAS EAR/PLS OXIMETRY 1: CPT

## 2023-11-22 PROCEDURE — 94010 BREATHING CAPACITY TEST: CPT

## 2024-06-16 ENCOUNTER — APPOINTMENT (OUTPATIENT)
Dept: GENERAL RADIOLOGY | Age: 64
DRG: 871 | End: 2024-06-16
Payer: COMMERCIAL

## 2024-06-16 ENCOUNTER — HOSPITAL ENCOUNTER (INPATIENT)
Age: 64
LOS: 4 days | DRG: 871 | End: 2024-06-20
Attending: STUDENT IN AN ORGANIZED HEALTH CARE EDUCATION/TRAINING PROGRAM | Admitting: INTERNAL MEDICINE
Payer: COMMERCIAL

## 2024-06-16 ENCOUNTER — APPOINTMENT (OUTPATIENT)
Dept: CT IMAGING | Age: 64
DRG: 871 | End: 2024-06-16
Payer: COMMERCIAL

## 2024-06-16 DIAGNOSIS — I46.9 CARDIAC ARREST (HCC): Primary | ICD-10-CM

## 2024-06-16 DIAGNOSIS — J18.9 MULTIFOCAL PNEUMONIA: ICD-10-CM

## 2024-06-16 DIAGNOSIS — J44.1 COPD WITH ACUTE EXACERBATION (HCC): ICD-10-CM

## 2024-06-16 DIAGNOSIS — J96.01 ACUTE RESPIRATORY FAILURE WITH HYPOXIA AND HYPERCAPNIA (HCC): ICD-10-CM

## 2024-06-16 DIAGNOSIS — I16.1 HYPERTENSIVE EMERGENCY: ICD-10-CM

## 2024-06-16 DIAGNOSIS — J96.02 ACUTE RESPIRATORY FAILURE WITH HYPOXIA AND HYPERCAPNIA (HCC): ICD-10-CM

## 2024-06-16 PROBLEM — M06.9 RHEUMATOID ARTHRITIS (HCC): Status: ACTIVE | Noted: 2024-04-18

## 2024-06-16 PROBLEM — R79.89 ELEVATED LACTIC ACID LEVEL: Status: ACTIVE | Noted: 2024-06-16

## 2024-06-16 PROBLEM — Z87.891 FORMER SMOKER: Status: ACTIVE | Noted: 2024-06-16

## 2024-06-16 PROBLEM — J98.2 PNEUMOMEDIASTINUM (HCC): Status: ACTIVE | Noted: 2024-06-16

## 2024-06-16 PROBLEM — L40.50 PSORIATIC ARTHRITIS (HCC): Status: ACTIVE | Noted: 2020-12-07

## 2024-06-16 PROBLEM — S22.22XK CLOSED FRACTURE OF BODY OF STERNUM WITH NONUNION: Status: ACTIVE | Noted: 2024-06-16

## 2024-06-16 PROBLEM — Z79.899 MEDICAL MARIJUANA USE: Status: ACTIVE | Noted: 2024-06-16

## 2024-06-16 PROBLEM — R79.89 ELEVATED LFTS: Status: ACTIVE | Noted: 2024-06-16

## 2024-06-16 PROBLEM — D72.825 BANDEMIA: Status: ACTIVE | Noted: 2024-06-16

## 2024-06-16 LAB
ALBUMIN SERPL-MCNC: 4.1 G/DL (ref 3.4–5)
ALBUMIN/GLOB SERPL: 1.8 {RATIO} (ref 1.1–2.2)
ALP SERPL-CCNC: 96 U/L (ref 40–129)
ALT SERPL-CCNC: 88 U/L (ref 10–40)
AMPHETAMINES UR QL SCN>1000 NG/ML: ABNORMAL
ANION GAP SERPL CALCULATED.3IONS-SCNC: 23 MMOL/L (ref 3–16)
ANISOCYTOSIS BLD QL SMEAR: ABNORMAL
APTT BLD: 33 SEC (ref 22.1–36.4)
AST SERPL-CCNC: 110 U/L (ref 15–37)
BACTERIA URNS QL MICRO: ABNORMAL /HPF
BARBITURATES UR QL SCN>200 NG/ML: ABNORMAL
BASE EXCESS BLDA CALC-SCNC: -4 MMOL/L (ref -3–3)
BASE EXCESS BLDV CALC-SCNC: -13.2 MMOL/L (ref -3–3)
BASOPHILS # BLD: 0.2 K/UL (ref 0–0.2)
BASOPHILS NFR BLD: 1 %
BENZODIAZ UR QL SCN>200 NG/ML: POSITIVE
BILIRUB SERPL-MCNC: <0.2 MG/DL (ref 0–1)
BILIRUB UR QL STRIP.AUTO: NEGATIVE
BUN SERPL-MCNC: 26 MG/DL (ref 7–20)
CALCIUM SERPL-MCNC: 9.5 MG/DL (ref 8.3–10.6)
CANNABINOIDS UR QL SCN>50 NG/ML: POSITIVE
CHLORIDE SERPL-SCNC: 104 MMOL/L (ref 99–110)
CK SERPL-CCNC: 1300 U/L (ref 26–192)
CK SERPL-CCNC: 971 U/L (ref 26–192)
CLARITY UR: CLEAR
CO2 BLDA-SCNC: 23 MMOL/L
CO2 BLDV-SCNC: 18 MMOL/L
CO2 SERPL-SCNC: 15 MMOL/L (ref 21–32)
COARSE GRAN CASTS #/AREA URNS LPF: ABNORMAL /LPF (ref 0–2)
COCAINE UR QL SCN: ABNORMAL
COHGB MFR BLDV: 2.1 % (ref 0–1.5)
COLOR UR: YELLOW
CREAT SERPL-MCNC: 1 MG/DL (ref 0.6–1.2)
DEPRECATED RDW RBC AUTO: 14.4 % (ref 12.4–15.4)
DRUG SCREEN COMMENT UR-IMP: ABNORMAL
EKG ATRIAL RATE: 111 BPM
EKG DIAGNOSIS: NORMAL
EKG P AXIS: 45 DEGREES
EKG P-R INTERVAL: 144 MS
EKG Q-T INTERVAL: 320 MS
EKG QRS DURATION: 94 MS
EKG QTC CALCULATION (BAZETT): 435 MS
EKG R AXIS: 24 DEGREES
EKG T AXIS: 80 DEGREES
EKG VENTRICULAR RATE: 111 BPM
EOSINOPHIL # BLD: 0.7 K/UL (ref 0–0.6)
EOSINOPHIL NFR BLD: 4 %
EPI CELLS #/AREA URNS HPF: ABNORMAL /HPF (ref 0–5)
FENTANYL SCREEN, URINE: POSITIVE
GFR SERPLBLD CREATININE-BSD FMLA CKD-EPI: 63 ML/MIN/{1.73_M2}
GLUCOSE BLD-MCNC: 134 MG/DL (ref 70–99)
GLUCOSE BLD-MCNC: 148 MG/DL (ref 70–99)
GLUCOSE BLD-MCNC: 168 MG/DL (ref 70–99)
GLUCOSE BLD-MCNC: 208 MG/DL (ref 70–99)
GLUCOSE BLD-MCNC: 261 MG/DL (ref 70–99)
GLUCOSE SERPL-MCNC: 139 MG/DL (ref 70–99)
GLUCOSE UR STRIP.AUTO-MCNC: 100 MG/DL
HCO3 BLDA-SCNC: 21.4 MMOL/L (ref 21–29)
HCO3 BLDV-SCNC: 16.2 MMOL/L (ref 23–29)
HCT VFR BLD AUTO: 45.4 % (ref 36–48)
HGB BLD-MCNC: 14.3 G/DL (ref 12–16)
HGB UR QL STRIP.AUTO: ABNORMAL
INR PPP: 1.16 (ref 0.85–1.15)
KETONES UR STRIP.AUTO-MCNC: NEGATIVE MG/DL
LACTATE BLDV-SCNC: 2.2 MMOL/L (ref 0.4–2)
LACTATE BLDV-SCNC: 3.4 MMOL/L (ref 0.4–1.9)
LACTATE BLDV-SCNC: 3.7 MMOL/L (ref 0.4–2)
LACTATE BLDV-SCNC: 4.9 MMOL/L (ref 0.4–2)
LACTATE BLDV-SCNC: 8.3 MMOL/L (ref 0.4–1.9)
LEUKOCYTE ESTERASE UR QL STRIP.AUTO: NEGATIVE
LIPASE SERPL-CCNC: 69 U/L (ref 13–60)
LYMPHOCYTES # BLD: 6.1 K/UL (ref 1–5.1)
LYMPHOCYTES NFR BLD: 34 %
MACROCYTES BLD QL SMEAR: ABNORMAL
MCH RBC QN AUTO: 31.3 PG (ref 26–34)
MCHC RBC AUTO-ENTMCNC: 31.4 G/DL (ref 31–36)
MCV RBC AUTO: 99.7 FL (ref 80–100)
METAMYELOCYTES NFR BLD MANUAL: 2 %
METHADONE UR QL SCN>300 NG/ML: ABNORMAL
METHGB MFR BLDV: 0.2 %
MONOCYTES # BLD: 0.7 K/UL (ref 0–1.3)
MONOCYTES NFR BLD: 4 %
NEUTROPHILS # BLD: 8.9 K/UL (ref 1.7–7.7)
NEUTROPHILS NFR BLD: 42 %
NEUTS BAND NFR BLD MANUAL: 10 % (ref 0–7)
NITRITE UR QL STRIP.AUTO: NEGATIVE
NT-PROBNP SERPL-MCNC: 196 PG/ML (ref 0–124)
O2 THERAPY: ABNORMAL
OPIATES UR QL SCN>300 NG/ML: ABNORMAL
OXYCODONE UR QL SCN: ABNORMAL
PATH INTERP BLD-IMP: YES
PCO2 BLDA: 38.8 MM HG (ref 35–45)
PCO2 BLDV: 50.3 MMHG (ref 40–50)
PCP UR QL SCN>25 NG/ML: ABNORMAL
PERFORMED ON: ABNORMAL
PH BLDA: 7.35 [PH] (ref 7.35–7.45)
PH BLDV: 7.12 [PH] (ref 7.35–7.45)
PH UR STRIP.AUTO: 6 [PH] (ref 5–8)
PH UR STRIP: 5 [PH]
PLATELET # BLD AUTO: 289 K/UL (ref 135–450)
PLATELET BLD QL SMEAR: ADEQUATE
PMV BLD AUTO: 8.9 FL (ref 5–10.5)
PO2 BLDA: 75 MM HG (ref 75–108)
PO2 BLDV: 141.4 MMHG (ref 25–40)
POC SAMPLE TYPE: ABNORMAL
POLYCHROMASIA BLD QL SMEAR: ABNORMAL
POTASSIUM SERPL-SCNC: 4.9 MMOL/L (ref 3.5–5.1)
PROT SERPL-MCNC: 6.4 G/DL (ref 6.4–8.2)
PROT UR STRIP.AUTO-MCNC: 100 MG/DL
PROTHROMBIN TIME: 15 SEC (ref 11.9–14.9)
RBC # BLD AUTO: 4.55 M/UL (ref 4–5.2)
RBC #/AREA URNS HPF: ABNORMAL /HPF (ref 0–4)
SAO2 % BLDA: 94 % (ref 93–100)
SAO2 % BLDV: 98 %
SLIDE REVIEW: ABNORMAL
SODIUM SERPL-SCNC: 142 MMOL/L (ref 136–145)
SP GR UR STRIP.AUTO: >=1.03 (ref 1–1.03)
TROPONIN, HIGH SENSITIVITY: 33 NG/L (ref 0–14)
TROPONIN, HIGH SENSITIVITY: 34 NG/L (ref 0–14)
TROPONIN, HIGH SENSITIVITY: 48 NG/L (ref 0–14)
TROPONIN, HIGH SENSITIVITY: 64 NG/L (ref 0–14)
UA COMPLETE W REFLEX CULTURE PNL UR: ABNORMAL
UA DIPSTICK W REFLEX MICRO PNL UR: YES
URN SPEC COLLECT METH UR: ABNORMAL
UROBILINOGEN UR STRIP-ACNC: 0.2 E.U./DL
VARIANT LYMPHS NFR BLD MANUAL: 3 % (ref 0–6)
WBC # BLD AUTO: 16.4 K/UL (ref 4–11)
WBC #/AREA URNS HPF: ABNORMAL /HPF (ref 0–5)

## 2024-06-16 PROCEDURE — 93010 ELECTROCARDIOGRAM REPORT: CPT | Performed by: INTERNAL MEDICINE

## 2024-06-16 PROCEDURE — 82947 ASSAY GLUCOSE BLOOD QUANT: CPT

## 2024-06-16 PROCEDURE — 83690 ASSAY OF LIPASE: CPT

## 2024-06-16 PROCEDURE — 2700000000 HC OXYGEN THERAPY PER DAY

## 2024-06-16 PROCEDURE — 94640 AIRWAY INHALATION TREATMENT: CPT

## 2024-06-16 PROCEDURE — 99285 EMERGENCY DEPT VISIT HI MDM: CPT

## 2024-06-16 PROCEDURE — 2580000003 HC RX 258: Performed by: INTERNAL MEDICINE

## 2024-06-16 PROCEDURE — 0BCB8ZZ EXTIRPATION OF MATTER FROM LEFT LOWER LOBE BRONCHUS, VIA NATURAL OR ARTIFICIAL OPENING ENDOSCOPIC: ICD-10-PCS | Performed by: INTERNAL MEDICINE

## 2024-06-16 PROCEDURE — 94002 VENT MGMT INPAT INIT DAY: CPT

## 2024-06-16 PROCEDURE — 80307 DRUG TEST PRSMV CHEM ANLYZR: CPT

## 2024-06-16 PROCEDURE — 5A1945Z RESPIRATORY VENTILATION, 24-96 CONSECUTIVE HOURS: ICD-10-PCS | Performed by: INTERNAL MEDICINE

## 2024-06-16 PROCEDURE — 71045 X-RAY EXAM CHEST 1 VIEW: CPT

## 2024-06-16 PROCEDURE — 83880 ASSAY OF NATRIURETIC PEPTIDE: CPT

## 2024-06-16 PROCEDURE — 0BC68ZZ EXTIRPATION OF MATTER FROM RIGHT LOWER LOBE BRONCHUS, VIA NATURAL OR ARTIFICIAL OPENING ENDOSCOPIC: ICD-10-PCS | Performed by: INTERNAL MEDICINE

## 2024-06-16 PROCEDURE — 96365 THER/PROPH/DIAG IV INF INIT: CPT

## 2024-06-16 PROCEDURE — 87205 SMEAR GRAM STAIN: CPT

## 2024-06-16 PROCEDURE — 80053 COMPREHEN METABOLIC PANEL: CPT

## 2024-06-16 PROCEDURE — 71260 CT THORAX DX C+: CPT

## 2024-06-16 PROCEDURE — 74018 RADEX ABDOMEN 1 VIEW: CPT

## 2024-06-16 PROCEDURE — 36415 COLL VENOUS BLD VENIPUNCTURE: CPT

## 2024-06-16 PROCEDURE — 87040 BLOOD CULTURE FOR BACTERIA: CPT

## 2024-06-16 PROCEDURE — 6370000000 HC RX 637 (ALT 250 FOR IP): Performed by: STUDENT IN AN ORGANIZED HEALTH CARE EDUCATION/TRAINING PROGRAM

## 2024-06-16 PROCEDURE — 99291 CRITICAL CARE FIRST HOUR: CPT | Performed by: INTERNAL MEDICINE

## 2024-06-16 PROCEDURE — 70450 CT HEAD/BRAIN W/O DYE: CPT

## 2024-06-16 PROCEDURE — 0BC98ZZ EXTIRPATION OF MATTER FROM LINGULA BRONCHUS, VIA NATURAL OR ARTIFICIAL OPENING ENDOSCOPIC: ICD-10-PCS | Performed by: INTERNAL MEDICINE

## 2024-06-16 PROCEDURE — 6360000004 HC RX CONTRAST MEDICATION: Performed by: STUDENT IN AN ORGANIZED HEALTH CARE EDUCATION/TRAINING PROGRAM

## 2024-06-16 PROCEDURE — 51702 INSERT TEMP BLADDER CATH: CPT

## 2024-06-16 PROCEDURE — 0BC48ZZ EXTIRPATION OF MATTER FROM RIGHT UPPER LOBE BRONCHUS, VIA NATURAL OR ARTIFICIAL OPENING ENDOSCOPIC: ICD-10-PCS | Performed by: INTERNAL MEDICINE

## 2024-06-16 PROCEDURE — 6360000002 HC RX W HCPCS: Performed by: INTERNAL MEDICINE

## 2024-06-16 PROCEDURE — 87641 MR-STAPH DNA AMP PROBE: CPT

## 2024-06-16 PROCEDURE — 6370000000 HC RX 637 (ALT 250 FOR IP): Performed by: INTERNAL MEDICINE

## 2024-06-16 PROCEDURE — 2580000003 HC RX 258: Performed by: STUDENT IN AN ORGANIZED HEALTH CARE EDUCATION/TRAINING PROGRAM

## 2024-06-16 PROCEDURE — 96374 THER/PROPH/DIAG INJ IV PUSH: CPT

## 2024-06-16 PROCEDURE — 2500000003 HC RX 250 WO HCPCS: Performed by: INTERNAL MEDICINE

## 2024-06-16 PROCEDURE — 85025 COMPLETE CBC W/AUTO DIFF WBC: CPT

## 2024-06-16 PROCEDURE — 83605 ASSAY OF LACTIC ACID: CPT

## 2024-06-16 PROCEDURE — 94761 N-INVAS EAR/PLS OXIMETRY MLT: CPT

## 2024-06-16 PROCEDURE — 6360000002 HC RX W HCPCS: Performed by: STUDENT IN AN ORGANIZED HEALTH CARE EDUCATION/TRAINING PROGRAM

## 2024-06-16 PROCEDURE — 0BC28ZZ EXTIRPATION OF MATTER FROM CARINA, VIA NATURAL OR ARTIFICIAL OPENING ENDOSCOPIC: ICD-10-PCS | Performed by: INTERNAL MEDICINE

## 2024-06-16 PROCEDURE — 0BC88ZZ EXTIRPATION OF MATTER FROM LEFT UPPER LOBE BRONCHUS, VIA NATURAL OR ARTIFICIAL OPENING ENDOSCOPIC: ICD-10-PCS | Performed by: INTERNAL MEDICINE

## 2024-06-16 PROCEDURE — 81001 URINALYSIS AUTO W/SCOPE: CPT

## 2024-06-16 PROCEDURE — 2500000003 HC RX 250 WO HCPCS: Performed by: STUDENT IN AN ORGANIZED HEALTH CARE EDUCATION/TRAINING PROGRAM

## 2024-06-16 PROCEDURE — 0BC78ZZ EXTIRPATION OF MATTER FROM LEFT MAIN BRONCHUS, VIA NATURAL OR ARTIFICIAL OPENING ENDOSCOPIC: ICD-10-PCS | Performed by: INTERNAL MEDICINE

## 2024-06-16 PROCEDURE — 31622 DX BRONCHOSCOPE/WASH: CPT

## 2024-06-16 PROCEDURE — 82803 BLOOD GASES ANY COMBINATION: CPT

## 2024-06-16 PROCEDURE — 0BH17EZ INSERTION OF ENDOTRACHEAL AIRWAY INTO TRACHEA, VIA NATURAL OR ARTIFICIAL OPENING: ICD-10-PCS | Performed by: INTERNAL MEDICINE

## 2024-06-16 PROCEDURE — 0BC38ZZ EXTIRPATION OF MATTER FROM RIGHT MAIN BRONCHUS, VIA NATURAL OR ARTIFICIAL OPENING ENDOSCOPIC: ICD-10-PCS | Performed by: INTERNAL MEDICINE

## 2024-06-16 PROCEDURE — 6360000002 HC RX W HCPCS

## 2024-06-16 PROCEDURE — 2000000000 HC ICU R&B

## 2024-06-16 PROCEDURE — 87070 CULTURE OTHR SPECIMN AEROBIC: CPT

## 2024-06-16 PROCEDURE — 85730 THROMBOPLASTIN TIME PARTIAL: CPT

## 2024-06-16 PROCEDURE — 84484 ASSAY OF TROPONIN QUANT: CPT

## 2024-06-16 PROCEDURE — 31645 BRNCHSC W/THER ASPIR 1ST: CPT | Performed by: INTERNAL MEDICINE

## 2024-06-16 PROCEDURE — 82550 ASSAY OF CK (CPK): CPT

## 2024-06-16 PROCEDURE — 83036 HEMOGLOBIN GLYCOSYLATED A1C: CPT

## 2024-06-16 PROCEDURE — 99291 CRITICAL CARE FIRST HOUR: CPT

## 2024-06-16 PROCEDURE — 0BC58ZZ EXTIRPATION OF MATTER FROM RIGHT MIDDLE LOBE BRONCHUS, VIA NATURAL OR ARTIFICIAL OPENING ENDOSCOPIC: ICD-10-PCS | Performed by: INTERNAL MEDICINE

## 2024-06-16 PROCEDURE — 93005 ELECTROCARDIOGRAM TRACING: CPT | Performed by: STUDENT IN AN ORGANIZED HEALTH CARE EDUCATION/TRAINING PROGRAM

## 2024-06-16 PROCEDURE — 5A12012 PERFORMANCE OF CARDIAC OUTPUT, SINGLE, MANUAL: ICD-10-PCS | Performed by: INTERNAL MEDICINE

## 2024-06-16 PROCEDURE — 96375 TX/PRO/DX INJ NEW DRUG ADDON: CPT

## 2024-06-16 PROCEDURE — 85610 PROTHROMBIN TIME: CPT

## 2024-06-16 RX ORDER — LEFLUNOMIDE 20 MG/1
20 TABLET ORAL DAILY
COMMUNITY
Start: 2023-08-21

## 2024-06-16 RX ORDER — FLUTICASONE PROPIONATE 50 MCG
2 SPRAY, SUSPENSION (ML) NASAL DAILY
COMMUNITY
Start: 2024-05-06

## 2024-06-16 RX ORDER — FENTANYL CITRATE-0.9 % NACL/PF 20 MCG/2ML
50 SYRINGE (ML) INTRAVENOUS EVERY 30 MIN PRN
Status: DISCONTINUED | OUTPATIENT
Start: 2024-06-16 | End: 2024-06-17

## 2024-06-16 RX ORDER — GLUCAGON 1 MG/ML
1 KIT INJECTION PRN
Status: DISCONTINUED | OUTPATIENT
Start: 2024-06-16 | End: 2024-06-20 | Stop reason: HOSPADM

## 2024-06-16 RX ORDER — FENTANYL CITRATE-0.9 % NACL/PF 10 MCG/ML
25-200 PLASTIC BAG, INJECTION (ML) INTRAVENOUS CONTINUOUS
Status: DISCONTINUED | OUTPATIENT
Start: 2024-06-16 | End: 2024-06-17

## 2024-06-16 RX ORDER — SODIUM CHLORIDE 0.9 % (FLUSH) 0.9 %
5-40 SYRINGE (ML) INJECTION PRN
Status: DISCONTINUED | OUTPATIENT
Start: 2024-06-16 | End: 2024-06-20 | Stop reason: HOSPADM

## 2024-06-16 RX ORDER — PROPOFOL 10 MG/ML
5-50 INJECTION, EMULSION INTRAVENOUS CONTINUOUS
Status: DISCONTINUED | OUTPATIENT
Start: 2024-06-16 | End: 2024-06-16

## 2024-06-16 RX ORDER — ACETAMINOPHEN 325 MG/1
650 TABLET ORAL EVERY 6 HOURS PRN
Status: DISCONTINUED | OUTPATIENT
Start: 2024-06-16 | End: 2024-06-20 | Stop reason: HOSPADM

## 2024-06-16 RX ORDER — MAGNESIUM SULFATE IN WATER 40 MG/ML
2000 INJECTION, SOLUTION INTRAVENOUS ONCE
Status: COMPLETED | OUTPATIENT
Start: 2024-06-16 | End: 2024-06-16

## 2024-06-16 RX ORDER — ONDANSETRON 2 MG/ML
4 INJECTION INTRAMUSCULAR; INTRAVENOUS EVERY 6 HOURS PRN
Status: DISCONTINUED | OUTPATIENT
Start: 2024-06-16 | End: 2024-06-20 | Stop reason: HOSPADM

## 2024-06-16 RX ORDER — FLUTICASONE FUROATE AND VILANTEROL 200; 25 UG/1; UG/1
1 POWDER RESPIRATORY (INHALATION) DAILY
COMMUNITY
Start: 2023-06-19 | End: 2024-06-18

## 2024-06-16 RX ORDER — LACTOBACILLUS RHAMNOSUS GG 10B CELL
1 CAPSULE ORAL 2 TIMES DAILY WITH MEALS
Status: DISCONTINUED | OUTPATIENT
Start: 2024-06-16 | End: 2024-06-18

## 2024-06-16 RX ORDER — SODIUM CHLORIDE 9 MG/ML
INJECTION, SOLUTION INTRAVENOUS PRN
Status: DISCONTINUED | OUTPATIENT
Start: 2024-06-16 | End: 2024-06-20 | Stop reason: HOSPADM

## 2024-06-16 RX ORDER — ENOXAPARIN SODIUM 100 MG/ML
40 INJECTION SUBCUTANEOUS DAILY
Status: DISCONTINUED | OUTPATIENT
Start: 2024-06-16 | End: 2024-06-18

## 2024-06-16 RX ORDER — ONDANSETRON 4 MG/1
4 TABLET, ORALLY DISINTEGRATING ORAL EVERY 8 HOURS PRN
Status: DISCONTINUED | OUTPATIENT
Start: 2024-06-16 | End: 2024-06-20 | Stop reason: HOSPADM

## 2024-06-16 RX ORDER — IPRATROPIUM BROMIDE AND ALBUTEROL SULFATE 2.5; .5 MG/3ML; MG/3ML
2 SOLUTION RESPIRATORY (INHALATION) ONCE
Status: COMPLETED | OUTPATIENT
Start: 2024-06-16 | End: 2024-06-16

## 2024-06-16 RX ORDER — INSULIN LISPRO 100 [IU]/ML
0-8 INJECTION, SOLUTION INTRAVENOUS; SUBCUTANEOUS EVERY 4 HOURS
Status: DISCONTINUED | OUTPATIENT
Start: 2024-06-16 | End: 2024-06-18

## 2024-06-16 RX ORDER — POLYETHYLENE GLYCOL 3350 17 G/17G
17 POWDER, FOR SOLUTION ORAL DAILY PRN
Status: DISCONTINUED | OUTPATIENT
Start: 2024-06-16 | End: 2024-06-20 | Stop reason: HOSPADM

## 2024-06-16 RX ORDER — GUSELKUMAB 100 MG/ML
100 INJECTION SUBCUTANEOUS SEE ADMIN INSTRUCTIONS
COMMUNITY
Start: 2024-06-13

## 2024-06-16 RX ORDER — ALBUTEROL SULFATE 2.5 MG/3ML
2.5 SOLUTION RESPIRATORY (INHALATION) EVERY 4 HOURS PRN
COMMUNITY
Start: 2024-05-06

## 2024-06-16 RX ORDER — METHOTREXATE 2.5 MG/1
12.5 TABLET ORAL
COMMUNITY
Start: 2024-05-06

## 2024-06-16 RX ORDER — ERGOCALCIFEROL 1.25 MG/1
50000 CAPSULE ORAL WEEKLY
COMMUNITY
Start: 2024-04-26

## 2024-06-16 RX ORDER — FUROSEMIDE 40 MG/1
20 TABLET ORAL DAILY
COMMUNITY
Start: 2024-05-06

## 2024-06-16 RX ORDER — DUPILUMAB 200 MG/1.14ML
200 INJECTION, SOLUTION SUBCUTANEOUS
COMMUNITY
Start: 2023-07-31

## 2024-06-16 RX ORDER — IPRATROPIUM BROMIDE AND ALBUTEROL SULFATE 2.5; .5 MG/3ML; MG/3ML
1 SOLUTION RESPIRATORY (INHALATION) ONCE
Status: COMPLETED | OUTPATIENT
Start: 2024-06-16 | End: 2024-06-16

## 2024-06-16 RX ORDER — SODIUM CHLORIDE 9 MG/ML
INJECTION, SOLUTION INTRAVENOUS CONTINUOUS
Status: DISCONTINUED | OUTPATIENT
Start: 2024-06-16 | End: 2024-06-17

## 2024-06-16 RX ORDER — ROCURONIUM BROMIDE 10 MG/ML
50 INJECTION, SOLUTION INTRAVENOUS ONCE
Status: COMPLETED | OUTPATIENT
Start: 2024-06-16 | End: 2024-06-16

## 2024-06-16 RX ORDER — FLUTICASONE FUROATE, UMECLIDINIUM BROMIDE AND VILANTEROL TRIFENATATE 200; 62.5; 25 UG/1; UG/1; UG/1
1 POWDER RESPIRATORY (INHALATION) DAILY
COMMUNITY
Start: 2024-05-06 | End: 2025-05-06

## 2024-06-16 RX ORDER — DEXTROSE MONOHYDRATE 100 MG/ML
INJECTION, SOLUTION INTRAVENOUS CONTINUOUS PRN
Status: DISCONTINUED | OUTPATIENT
Start: 2024-06-16 | End: 2024-06-20 | Stop reason: HOSPADM

## 2024-06-16 RX ORDER — FOLIC ACID 1 MG/1
1 TABLET ORAL DAILY
COMMUNITY
Start: 2024-05-08

## 2024-06-16 RX ORDER — NITROGLYCERIN 20 MG/100ML
5-200 INJECTION INTRAVENOUS CONTINUOUS
Status: DISCONTINUED | OUTPATIENT
Start: 2024-06-16 | End: 2024-06-16

## 2024-06-16 RX ORDER — MIDAZOLAM HYDROCHLORIDE 1 MG/ML
1-10 INJECTION, SOLUTION INTRAVENOUS CONTINUOUS
Status: DISCONTINUED | OUTPATIENT
Start: 2024-06-16 | End: 2024-06-16

## 2024-06-16 RX ORDER — IPRATROPIUM BROMIDE AND ALBUTEROL SULFATE 2.5; .5 MG/3ML; MG/3ML
1 SOLUTION RESPIRATORY (INHALATION) EVERY 4 HOURS PRN
COMMUNITY
Start: 2024-05-06

## 2024-06-16 RX ORDER — CHLORHEXIDINE GLUCONATE ORAL RINSE 1.2 MG/ML
15 SOLUTION DENTAL 2 TIMES DAILY
Status: DISCONTINUED | OUTPATIENT
Start: 2024-06-16 | End: 2024-06-18

## 2024-06-16 RX ORDER — PROPOFOL 10 MG/ML
INJECTION, EMULSION INTRAVENOUS
Status: COMPLETED
Start: 2024-06-16 | End: 2024-06-16

## 2024-06-16 RX ORDER — FENTANYL CITRATE-0.9 % NACL/PF 10 MCG/ML
25-200 PLASTIC BAG, INJECTION (ML) INTRAVENOUS CONTINUOUS
Status: DISCONTINUED | OUTPATIENT
Start: 2024-06-16 | End: 2024-06-16

## 2024-06-16 RX ORDER — ACETAMINOPHEN 650 MG/1
650 SUPPOSITORY RECTAL EVERY 6 HOURS PRN
Status: DISCONTINUED | OUTPATIENT
Start: 2024-06-16 | End: 2024-06-20 | Stop reason: HOSPADM

## 2024-06-16 RX ORDER — ETANERCEPT 50 MG/ML
25 SOLUTION SUBCUTANEOUS
COMMUNITY
Start: 2024-05-06

## 2024-06-16 RX ORDER — 0.9 % SODIUM CHLORIDE 0.9 %
1000 INTRAVENOUS SOLUTION INTRAVENOUS ONCE
Status: COMPLETED | OUTPATIENT
Start: 2024-06-16 | End: 2024-06-16

## 2024-06-16 RX ORDER — PROPOFOL 10 MG/ML
5-50 INJECTION, EMULSION INTRAVENOUS CONTINUOUS
Status: DISCONTINUED | OUTPATIENT
Start: 2024-06-16 | End: 2024-06-17

## 2024-06-16 RX ORDER — ROCURONIUM BROMIDE 10 MG/ML
0.6 INJECTION, SOLUTION INTRAVENOUS ONCE
Status: COMPLETED | OUTPATIENT
Start: 2024-06-16 | End: 2024-06-16

## 2024-06-16 RX ORDER — IPRATROPIUM BROMIDE AND ALBUTEROL SULFATE 2.5; .5 MG/3ML; MG/3ML
1 SOLUTION RESPIRATORY (INHALATION)
Status: DISCONTINUED | OUTPATIENT
Start: 2024-06-16 | End: 2024-06-17

## 2024-06-16 RX ORDER — ALBUTEROL SULFATE AND BUDESONIDE 90; 80 UG/1; UG/1
2 AEROSOL, METERED RESPIRATORY (INHALATION) 4 TIMES DAILY PRN
COMMUNITY
Start: 2024-02-05

## 2024-06-16 RX ORDER — SODIUM CHLORIDE 0.9 % (FLUSH) 0.9 %
5-40 SYRINGE (ML) INJECTION EVERY 12 HOURS SCHEDULED
Status: DISCONTINUED | OUTPATIENT
Start: 2024-06-16 | End: 2024-06-20 | Stop reason: HOSPADM

## 2024-06-16 RX ADMIN — IPRATROPIUM BROMIDE AND ALBUTEROL SULFATE 1 DOSE: 2.5; .5 SOLUTION RESPIRATORY (INHALATION) at 16:08

## 2024-06-16 RX ADMIN — ENOXAPARIN SODIUM 40 MG: 100 INJECTION SUBCUTANEOUS at 11:07

## 2024-06-16 RX ADMIN — CHLORHEXIDINE GLUCONATE, 0.12% ORAL RINSE 15 ML: 1.2 SOLUTION DENTAL at 11:14

## 2024-06-16 RX ADMIN — Medication 100 MCG/HR: at 20:47

## 2024-06-16 RX ADMIN — PROPOFOL 20 MCG/KG/MIN: 10 INJECTION, EMULSION INTRAVENOUS at 05:08

## 2024-06-16 RX ADMIN — ROCURONIUM BROMIDE 50 MG: 10 INJECTION, SOLUTION INTRAVENOUS at 05:05

## 2024-06-16 RX ADMIN — CEFEPIME 2000 MG: 2 INJECTION, POWDER, FOR SOLUTION INTRAVENOUS at 11:17

## 2024-06-16 RX ADMIN — MAGNESIUM SULFATE HEPTAHYDRATE 2000 MG: 40 INJECTION, SOLUTION INTRAVENOUS at 08:01

## 2024-06-16 RX ADMIN — Medication 1500 MG: at 13:08

## 2024-06-16 RX ADMIN — CEFTRIAXONE SODIUM 1000 MG: 1 INJECTION, POWDER, FOR SOLUTION INTRAMUSCULAR; INTRAVENOUS at 08:11

## 2024-06-16 RX ADMIN — WATER 125 MG: 1 INJECTION INTRAMUSCULAR; INTRAVENOUS; SUBCUTANEOUS at 08:00

## 2024-06-16 RX ADMIN — IPRATROPIUM BROMIDE AND ALBUTEROL SULFATE 1 DOSE: .5; 3 SOLUTION RESPIRATORY (INHALATION) at 05:30

## 2024-06-16 RX ADMIN — ROCURONIUM BROMIDE 50 MG: 50 INJECTION INTRAVENOUS at 06:52

## 2024-06-16 RX ADMIN — INSULIN LISPRO 4 UNITS: 100 INJECTION, SOLUTION INTRAVENOUS; SUBCUTANEOUS at 12:58

## 2024-06-16 RX ADMIN — CHLORHEXIDINE GLUCONATE, 0.12% ORAL RINSE 15 ML: 1.2 SOLUTION DENTAL at 19:22

## 2024-06-16 RX ADMIN — MUPIROCIN: 20 OINTMENT TOPICAL at 11:18

## 2024-06-16 RX ADMIN — SODIUM CHLORIDE: 9 INJECTION, SOLUTION INTRAVENOUS at 23:30

## 2024-06-16 RX ADMIN — WATER 40 MG: 1 INJECTION INTRAMUSCULAR; INTRAVENOUS; SUBCUTANEOUS at 19:59

## 2024-06-16 RX ADMIN — NITROGLYCERIN 100 MCG/MIN: 20 INJECTION INTRAVENOUS at 05:00

## 2024-06-16 RX ADMIN — INSULIN LISPRO 4 UNITS: 100 INJECTION, SOLUTION INTRAVENOUS; SUBCUTANEOUS at 17:18

## 2024-06-16 RX ADMIN — SODIUM CHLORIDE: 9 INJECTION, SOLUTION INTRAVENOUS at 11:15

## 2024-06-16 RX ADMIN — ONDANSETRON 4 MG: 2 INJECTION INTRAMUSCULAR; INTRAVENOUS at 18:16

## 2024-06-16 RX ADMIN — AZITHROMYCIN MONOHYDRATE 500 MG: 500 INJECTION, POWDER, LYOPHILIZED, FOR SOLUTION INTRAVENOUS at 08:54

## 2024-06-16 RX ADMIN — Medication 50 MCG/HR: at 09:20

## 2024-06-16 RX ADMIN — NITROGLYCERIN 50 MCG/MIN: 20 INJECTION INTRAVENOUS at 05:17

## 2024-06-16 RX ADMIN — PROPOFOL 25 MCG/KG/MIN: 10 INJECTION, EMULSION INTRAVENOUS at 23:40

## 2024-06-16 RX ADMIN — SODIUM CHLORIDE, PRESERVATIVE FREE 10 ML: 5 INJECTION INTRAVENOUS at 20:00

## 2024-06-16 RX ADMIN — Medication 1 CAPSULE: at 11:07

## 2024-06-16 RX ADMIN — PROPOFOL 20 MCG/KG/MIN: 10 INJECTION, EMULSION INTRAVENOUS at 13:50

## 2024-06-16 RX ADMIN — CEFEPIME 2000 MG: 2 INJECTION, POWDER, FOR SOLUTION INTRAVENOUS at 22:14

## 2024-06-16 RX ADMIN — IPRATROPIUM BROMIDE AND ALBUTEROL SULFATE 1 DOSE: 2.5; .5 SOLUTION RESPIRATORY (INHALATION) at 11:27

## 2024-06-16 RX ADMIN — IPRATROPIUM BROMIDE AND ALBUTEROL SULFATE 1 DOSE: 2.5; .5 SOLUTION RESPIRATORY (INHALATION) at 20:27

## 2024-06-16 RX ADMIN — IOPAMIDOL 75 ML: 755 INJECTION, SOLUTION INTRAVENOUS at 07:16

## 2024-06-16 RX ADMIN — Medication 1 CAPSULE: at 17:15

## 2024-06-16 RX ADMIN — SODIUM CHLORIDE 1000 ML: 9 INJECTION, SOLUTION INTRAVENOUS at 17:11

## 2024-06-16 RX ADMIN — IPRATROPIUM BROMIDE AND ALBUTEROL SULFATE 2 DOSE: 2.5; .5 SOLUTION RESPIRATORY (INHALATION) at 08:00

## 2024-06-16 RX ADMIN — FAMOTIDINE 20 MG: 10 INJECTION, SOLUTION INTRAVENOUS at 20:02

## 2024-06-16 RX ADMIN — FAMOTIDINE 20 MG: 10 INJECTION, SOLUTION INTRAVENOUS at 11:07

## 2024-06-16 RX ADMIN — MUPIROCIN: 20 OINTMENT TOPICAL at 19:57

## 2024-06-16 RX ADMIN — MIDAZOLAM IN SODIUM CHLORIDE 2 MG/HR: 1 INJECTION INTRAVENOUS at 09:22

## 2024-06-16 RX ADMIN — SODIUM CHLORIDE: 9 INJECTION, SOLUTION INTRAVENOUS at 13:06

## 2024-06-16 RX ADMIN — SODIUM CHLORIDE, PRESERVATIVE FREE 10 ML: 5 INJECTION INTRAVENOUS at 11:18

## 2024-06-16 ASSESSMENT — PULMONARY FUNCTION TESTS
PIF_VALUE: 35
PIF_VALUE: 46
PIF_VALUE: 27
PIF_VALUE: 21
PIF_VALUE: 20
PIF_VALUE: 40
PIF_VALUE: 21

## 2024-06-16 NOTE — H&P
am COMPARISON: 03/01/2021 HISTORY: ORDERING SYSTEM PROVIDED HISTORY: intubated post ROSC, arrest TECHNOLOGIST PROVIDED HISTORY: Reason for exam:->intubated post ROSC, arrest Reason for Exam: cardiac arrest FINDINGS: Endotracheal tube with the tip approximately 6-7 cm above the duglas at the level of the clavicular heads.  The NG tube tip is near the GE junction with the side port in the thoracic esophagus.  NG tube can be advanced by 10-15 cm.  Cardiac leads and pacer pad over the patient.  Tubing over the upper chest does not appear to conform to expected venous line and may be external to the patient. Areas of opacity in the left upper lobe and apex more than the patchy opacities in the left lower lobe.  The right lung is better aerated than the left lung with minimal right perihilar opacities/edema.  Cardiac silhouette is not enlarged.  Mild prominence of the central vasculature. The bones are osteopenic.  Old deformity of the right clavicle.  No soft tissue emphysema.     1.  Endotracheal tube and nasogastric tube placement.  NG tube should be advanced by 10-15 cm. 2.  Opacities in the left upper lobe and apex more than the left lower lobe. Minimal perihilar opacities or edema at the right lung. 3.  Tubing over the upper chest does not conform to expected venous course and may be outside the patient.  Correlate if supposed to have central venous line at the chest.          RADIOLOGY:  XR ABDOMEN (KUB) (SINGLE AP VIEW)    Result Date: 6/16/2024  EXAMINATION: ONE SUPINE XRAY VIEW(S) OF THE ABDOMEN 6/16/2024 7:24 am COMPARISON: None. HISTORY: ORDERING SYSTEM PROVIDED HISTORY: NGT placement TECHNOLOGIST PROVIDED HISTORY: Reason for exam:->NGT placement Reason for Exam: NGT placement FINDINGS: Enteric tube is noted projecting over the mid to distal esophagus.     Enteric tube is noted projecting over the mid to distal esophagus.  Recommend repositioning.     XR ABDOMEN (KUB) (SINGLE AP VIEW)    Result Date:  6/16/2024  EXAMINATION: ONE SUPINE XRAY VIEW(S) OF THE ABDOMEN 6/16/2024 7:39 am COMPARISON: Radiograph earlier the same day. HISTORY: ORDERING SYSTEM PROVIDED HISTORY: NGT placement 3 TECHNOLOGIST PROVIDED HISTORY: Reason for exam:->NGT placement 3 Reason for Exam: NGT placement 3 FINDINGS: Enteric tube is noted coursing below the level of the diaphragm with its side port and tip projecting over the area of the stomach in satisfactory position.  Endotracheal tube is noted with its tip approximately 5.9 cm from the duglas.  There is left lung airspace disease.     Enteric tube is noted coursing below the level of the diaphragm with its side port and tip projecting over the area of the stomach in satisfactory position.     XR ABDOMEN (KUB) (SINGLE AP VIEW)    Result Date: 6/16/2024  EXAMINATION: ONE SUPINE XRAY VIEW(S) OF THE ABDOMEN 6/16/2024 7:39 am COMPARISON: None. HISTORY: ORDERING SYSTEM PROVIDED HISTORY: NGT placement 2 TECHNOLOGIST PROVIDED HISTORY: Reason for exam:->NGT placement 2 Reason for Exam: NGT placement 2 FINDINGS: Enteric tube is noted with its tip in the mid to distal esophagus.  Recommend repositioning.     Enteric tube is noted with its tip in the mid to distal esophagus. Recommend repositioning.     CT CHEST PULMONARY EMBOLISM W CONTRAST    Result Date: 6/16/2024  EXAMINATION: CTA OF THE CHEST 6/16/2024 7:11 am TECHNIQUE: CTA of the chest was performed after the administration of intravenous contrast.  Multiplanar reformatted images are provided for review.  MIP images are provided for review. Automated exposure control, iterative reconstruction, and/or weight based adjustment of the mA/kV was utilized to reduce the radiation dose to as low as reasonably achievable. COMPARISON: None. HISTORY: ORDERING SYSTEM PROVIDED HISTORY: cardiac arrest TECHNOLOGIST PROVIDED HISTORY: Reason for exam:->cardiac arrest Additional Contrast?->1 Reason for Exam: post cardiac arrest Additional signs and symptoms:  outside the patient.  Correlate if supposed to have central venous line at the chest.         PHYSICIAN CERTIFICATION    I certify that Caterina Chavez is expected to be hospitalized for >2 midnights based on the following assessment and plan:      ASSESSMENT:      Patient Active Problem List   Diagnosis    Acute respiratory failure with hypoxia (HCC)    Asthma    Depression    HTN (hypertension)    Asthma exacerbation attacks    Acute exacerbation of chronic obstructive pulmonary disease (COPD) (HCC)    Cardiopulmonary arrest (HCC)    Psoriatic arthritis (HCC)    Rheumatoid arthritis (HCC)       Principal Problem:    Cardiopulmonary arrest (HCC)  Active Problems:    Depression    HTN (hypertension)    Acute exacerbation of chronic obstructive pulmonary disease (COPD) (HCC)    Rheumatoid arthritis (HCC)  Resolved Problems:    * No resolved hospital problems. *      PLAN:     Cardiopulmonary arrest - likely due to severe COPD exacerbation - head CT neg, keep normothermic  Aspiration pneumonia - cefepime, vanco, avoid fever, await blood cx and sputum cx and sent off MRSA probe  Severe COPD - nebs, steroids for now - pulm consulted - significant sx at baseline, likely shampooing carpets with dog hair was trigger of symptoms - she may require paralytic  Pneumomediastinum - likely due to CPR with fractured sternum - repeat CXR - monitor closely for worsening  HTN emergency - now controlled with propofol, in fact needing to change to fentanyl and versed to do decreasing blood pressures  Rheumatoid arthritis - on high dose steroids for now which should control sx      DVT prophylaxis Yes:  lovenox  GI prophylaxis pepcid  Antibiotic prophylaxis:  Yes    Code status Full code    I spent >35 minutes providing critical care for cardiopulmonary arrest.  This time is excluding time spent performing procedures.    Please note that over 50 minutes was spent in evaluating the patient, review of records and results, discussion with

## 2024-06-16 NOTE — CONSULTS
INPATIENT PULMONARY CRITICAL CARE CONSULT NOTE      Chief Complaint/Referring Provider:  Patient is being seen at the request of Dr. Flanagan for a consultation for acute respiratory failure on ventilator     Presenting HPI: Patient was brought to the hospital status post cardiopulmonary arrest    As per the admitting provider-This is a 63 y.o. WF with PMHx sig for severe COPD who presents with cardiac arrest due to severe COPD after shampooing dog carpets at home.  She has been having a lot of pain with giving herself injections at home over the past few days.  She was sitting on the toilet yesterday, then called for her significant other with severe sx of breathing and then she passed out on the toilet.  While awaiting EMS, she lost her pulse.  They did obtain ROSC after about 20 minutes of CPR.  Head CT neg, Chest CT shows pneumomediastinum and fractured sternum consistent with CPR as well as aspiration pneumonia.  Pt intially had HTN emergency with BP of 230/130s now down to 115/60s.  Changed to fentanyl and versed drips.  Pt started on cefepime and vanco for aspiration pneumonia.     Patient when seen in the ICU is critically ill on mechanical vent support, patient was on 70% oxygen and PEEP of 5 to maintain oxygen saturation, patient was on IV sedation to maintain patient mental synchrony, patient is afebrile, patient blood pressure was slightly on the higher side, patient glycemic control was acceptable, patient has a sinus tachycardia on the monitor, no other acute medical system could be obtained because of patient medical status which was precarious and critical  As per patient's family patient has rheumatoid arthritis and chronic pain syndrome and patient takes hydrocodone for that at home;no other issues identified     Patient Active Problem List    Diagnosis Date Noted    Acute exacerbation of chronic obstructive pulmonary disease (COPD) ([facility]) 06/16/2024    Cardiopulmonary arrest with successful  resuscitation ([facility]) 06/16/2024    Acute respiratory failure with hypoxia and hypercapnia ([facility]) 06/16/2024    Multifocal pneumonia 06/16/2024    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 06/16/2024    Pneumomediastinum ([facility]) 06/16/2024    Former smoker 06/16/2024    Medical marijuana use 06/16/2024    Bandemia 06/16/2024    Elevated LFTs 06/16/2024    Elevated lactic acid level 06/16/2024    Rheumatoid arthritis ([facility]) 04/18/2024    Acute respiratory failure with hypoxia ([facility]) 03/01/2021    Asthma 03/01/2021    Depression 03/01/2021    HTN (hypertension) 03/01/2021    Asthma exacerbation attacks 03/01/2021    Psoriatic arthritis ([facility]) 12/07/2020       Past Medical History:   Diagnosis Date    asthma     Depression     better after divorce    Hypertension     Neuropathy     R hand        Past Surgical History:   Procedure Laterality Date    CERVICAL LAMINECTOMY      DIAGNOSTIC CARDIAC CATH LAB PROCEDURE          Family History   Problem Relation Age of Onset    Cancer Father         lung ? asbestos - worked for [employer]    Emphysema Father     Asthma Sister     Asthma Brother         Social History     Tobacco Use    Smoking status: Former     Current packs/day: 0.00     Average packs/day: 1.5 packs/day for 20.0 years (30.0 ttl pk-yrs)     Types: Cigarettes     Start date: 7/16/1988     Quit date: 7/16/2008     Years since quitting: 15.9    Smokeless tobacco: Never   Substance Use Topics    Alcohol use: Yes     Comment: occasionally        Allergies   Allergen Reactions    Avelox [Moxifloxacin Hcl In Nacl]     Diovan [Valsartan]     Lisinopril      cough    Statins                Physical Exam:  Blood pressure (!) 146/87, pulse (!) 113, temperature 97.8 °F (36.6 °C), temperature source Bladder, resp. rate 26, height 1.6 m (5' 3\"), weight 73.8 kg (162 lb 11.2 oz), last menstrual period 08/25/2011, SpO2 100 %.'   Constitutional:  No acute distress.on mechanical ventilatory support  HENT:  Oropharynx is clear and  demonstrate no acute abnormality. SOFT TISSUES/SKULL:  No acute abnormality of the visualized skull or soft tissues.     No acute intracranial abnormality.     XR CHEST PORTABLE    Result Date: 6/16/2024  EXAMINATION: ONE XRAY VIEW OF THE CHEST 6/16/2024 5:32 am COMPARISON: 03/01/2021 HISTORY: ORDERING SYSTEM PROVIDED HISTORY: intubated post ROSC, arrest TECHNOLOGIST PROVIDED HISTORY: Reason for exam:->intubated post ROSC, arrest Reason for Exam: cardiac arrest FINDINGS: Endotracheal tube with the tip approximately 6-7 cm above the duglas at the level of the clavicular heads.  The NG tube tip is near the GE junction with the side port in the thoracic esophagus.  NG tube can be advanced by 10-15 cm.  Cardiac leads and pacer pad over the patient.  Tubing over the upper chest does not appear to conform to expected venous line and may be external to the patient. Areas of opacity in the left upper lobe and apex more than the patchy opacities in the left lower lobe.  The right lung is better aerated than the left lung with minimal right perihilar opacities/edema.  Cardiac silhouette is not enlarged.  Mild prominence of the central vasculature. The bones are osteopenic.  Old deformity of the right clavicle.  No soft tissue emphysema.     1.  Endotracheal tube and nasogastric tube placement.  NG tube should be advanced by 10-15 cm. 2.  Opacities in the left upper lobe and apex more than the left lower lobe. Minimal perihilar opacities or edema at the right lung. 3.  Tubing over the upper chest does not conform to expected venous course and may be outside the patient.  Correlate if supposed to have central venous line at the chest.           Echocardiogram:None in Epic   PFT:None in Epic         Assessment:  Principal Problem:    Cardiopulmonary arrest with successful resuscitation (HCC)  Active Problems:    Depression    HTN (hypertension)    Acute exacerbation of chronic obstructive pulmonary disease (COPD) ([facility])

## 2024-06-16 NOTE — CONSULTS
Pharmacy Note  Vancomycin Consult    Caterina Chavez is a 63 y.o. female started on Vancomycin to treat aspiration PNA; consult received from Dr. Flanagan to manage therapy. Also receiving the following antibiotics: cefepime.    Allergies:  Avelox [moxifloxacin hcl in nacl], Diovan [valsartan], Lisinopril, and Statins     Recent Labs     06/16/24  0458   WBC 16.4*   CREATININE 1.0     Estimated Creatinine Clearance: 55 mL/min (based on SCr of 1 mg/dL).    Intake/Output Summary (Last 24 hours) at 6/16/2024 1233  Last data filed at 6/16/2024 1124  Gross per 24 hour   Intake --   Output 1050 ml   Net -1050 ml     Wt Readings from Last 1 Encounters:   06/16/24 73.8 kg (162 lb 11.2 oz)       Body mass index is 28.82 kg/m².    Goal Vancomycin trough: 10-20 mcg/mL   Goal Vancomycin AUC: 400-600 mg/L.hr    Assessment/Plan:  Will initiate Vancomycin with a one time loading dose of 1500 mg x1, followed by 750 mg IV every 12 hours. Calculated  mg/L.hr. Vancomycin level ordered for 6/17 at 1100. Timing of the next level will be determined based on culture results, renal function, and clinical response.     Thank you for the consult.  Cristobal Mace, PharmD    6/16/2024 12:33 PM

## 2024-06-16 NOTE — PROGRESS NOTES
Pt coughing, bilious content coming from nose and mouth. NG placed to suction. Zofran administered. O2 sats 100%.

## 2024-06-16 NOTE — PLAN OF CARE
PLAN OF CARE    Received request for inpatient admission. Admitting provider Dr. Flanagan notified.    JAN HOWE MD  6/16/2024  8:03 AM

## 2024-06-16 NOTE — ED PROVIDER NOTES
[facility]  ED  EMERGENCY DEPARTMENT ENCOUNTER        Pt Name: Caterina Chavez  MRN: [id number]  Birthdate 1960  Date of evaluation: 6/16/2024  Provider: Bandar De Luna MD  PCP: Marcelo Bradley III, MD  Note Started: 6:27 AM EDT 6/16/24    CHIEF COMPLAINT       Chief Complaint   Patient presents with    Cardiac Arrest     Called for SOB, on toilet w/ breathing tx. Found LOC on toilet, no pulse. Asystole. CPR initiated. Epi x1. ROSC after 20 min of CRP, ROSC at 0412.   Cardiac arrest.    HISTORY OF PRESENT ILLNESS: 1 or more Elements     History from : Family common law  and EMS    Limitations to history : unresponsive.    Caterina Chavez is a 63 y.o. female who presents s/p cardiac arrest.  Witnessed arrest per EMS.  Called out in SOB prior to becoming unresponsive on the toilet.  911 immediately called.  No bystander CPR, approx 10 min downtime prior to EMS arrival on scene.  Asystole initial rhyhtm.  20 min CPR, 1 epi given prior to ROSC.  Intubated in the field.  Arrives with pulse, BVM ventilations on going.  Unresponsive.      Nursing Notes were all reviewed and agreed with or any disagreements were addressed in the HPI.    REVIEW OF SYSTEMS :      Positives and Pertinent negatives as per HPI.  ROS otherwise unremarkable.    SURGICAL HISTORY     Past Surgical History:   Procedure Laterality Date    CERVICAL LAMINECTOMY      DIAGNOSTIC CARDIAC CATH LAB PROCEDURE         CURRENTMEDICATIONS       Previous Medications    ALBUTEROL (PROVENTIL HFA;VENTOLIN HFA) 108 (90 BASE) MCG/ACT INHALER    Inhale 2 puffs into the lungs every 6 hours as needed.    ALPRAZOLAM (XANAX) 0.5 MG TABLET    TAKE 1 TABLET BY MOUTH THREE TIMES DAILY AS NEEDED FOR ANXIETY    BUDESONIDE-FORMOTEROL FUMARATE (SYMBICORT) 160-4.5 MCG/ACT AERO    Inhale 2 puffs into the lungs 2 times daily.    FAMOTIDINE (PEPCID) 20 MG TABLET    Take 1 tablet by mouth 2 times daily    FEXOFENADINE (ALLEGRA) 180 MG TABLET     words are mis-transcribed.)    Bandar De Luna MD (electronically signed)           Bandar De Luna MD  06/16/24 0880

## 2024-06-16 NOTE — RT PROTOCOL NOTE
RT Inhaler-Nebulizer Bronchodilator Protocol Note    There is a bronchodilator order in the chart from a provider indicating to follow the RT Bronchodilator Protocol and there is an “Initiate RT Inhaler-Nebulizer Bronchodilator Protocol” order as well (see protocol at bottom of note).    CXR Findings:  XR CHEST PORTABLE    Result Date: 6/16/2024  1. No pneumomediastinum is evident. 2. Redemonstration of left upper lobe and bibasilar opacities suggesting atelectasis or infection. 3. No pneumothorax.     XR CHEST PORTABLE    Result Date: 6/16/2024  1.  Endotracheal tube and nasogastric tube placement.  NG tube should be advanced by 10-15 cm. 2.  Opacities in the left upper lobe and apex more than the left lower lobe. Minimal perihilar opacities or edema at the right lung. 3.  Tubing over the upper chest does not conform to expected venous course and may be outside the patient.  Correlate if supposed to have central venous line at the chest.       The findings from the last RT Protocol Assessment were as follows:   History Pulmonary Disease: Chronic pulmonary disease  Respiratory Pattern: Use of accessory muscles, prolonged exhalation, or RR 26-30 bpm  Breath Sounds: Intermittent or unilateral wheezes  Cough: Strong, productive  Indication for Bronchodilator Therapy: On home bronchodilators  Bronchodilator Assessment Score: 13    Aerosolized bronchodilator medication orders have been revised according to the RT Inhaler-Nebulizer Bronchodilator Protocol below.    Respiratory Therapist to perform RT Therapy Protocol Assessment initially then follow the protocol.  Repeat RT Therapy Protocol Assessment PRN for score 0-3 or on second treatment, BID, and PRN for scores above 3.    No Indications - adjust the frequency to every 6 hours PRN wheezing or bronchospasm, if no treatments needed after 48 hours then discontinue using Per Protocol order mode.     If indication present, adjust the RT bronchodilator orders based on the

## 2024-06-16 NOTE — PROCEDURES
Bronchoscopy Note    ASA 4,Intubated patient       Patient with cardiopulmonary arrest with resuscitation, acute respiratory failure with hypoxemia and hypercapnia, multifocal pneumonia possible aspiration of the airways, patient was intubated and put on mechanical support in the ER, given the patient's clinical status and the data available it is decided to do a bronchoscopy and for that reason after informed consent, the RT was requested to come to the bedside, patient was already on IV sedation to maintain patient ventilator synchrony, the bronchoscope was introduced through the endotracheal tube using a T-piece adapter after timeout, patient was found to have extensive thick mucous plugs in the entire tracheobronchial tree along with that patient also had endotracheal tube which was high enough and it was pushed back by 2 cm, patient had no food particles in the airways on inspection, the entire tracheobronchial tree was therapeutically aspirated using Mucomyst and saline in the patency of the airways, patient tolerated the procedure well and did not have any complications  Estimated blood loss of 0  Further management depending on patient clinical status and the bronchoscopy results    Michael Velasco MD

## 2024-06-16 NOTE — PROGRESS NOTES
06/16/24 0530   Patient Observation   Pulse (!) 121   Respirations 22   SpO2 96 %   Breath Sounds   Right Upper Lobe Inspiratory wheezes;Expiratory wheezes   Right Middle Lobe Inspiratory wheezes;Expiratory wheezes   Right Lower Lobe Inspiratory wheezes;Expiratory wheezes   Left Upper Lobe Inspiratory wheezes;Expiratory wheezes   Left Lower Lobe Inspiratory wheezes;Expiratory wheezes   Vent Information   Ventilator Initiate Yes   Vent Mode AC/PC   Ventilator Settings   FiO2  60 %   Insp Time (sec) 0.9 sec   Resp Rate (Set) 22 bpm   Set Pressure 35   PEEP/CPAP (cmH2O) 5   Vent Patient Data (Readings)   Vt (Measured) 355 mL   Peak Inspiratory Pressure (cmH2O) 40 cmH2O   Rate Measured 22 br/min   Minute Volume (L/min) 7.8 Liters   Mean Airway Pressure (cmH2O) 16 cmH20   Inspiratory Time 0.9 sec   I:E Ratio 1:2.0   Vent Alarm Settings   High Pressure (cmH2O) 50 cmH2O   Low Minute Volume (lpm) 2 L/min   Low Exhaled Vt (ml) 200 mL   RR High (bpm) 40 br/min   Non-Surgical Airway 06/16/24 Endotracheal tube   Placement Date/Time: 06/16/24 0447   Placed By: (c) Other (comment)  Location: Oral  Airway Device: Endotracheal tube  Size: 7   Secured At 21 cm   Measured From Lips   Secured Location Right   Secured By Commercial tube song   Site Assessment Dry   Cuff Pressure 34 cm H2O

## 2024-06-16 NOTE — PROGRESS NOTES
06/16/24 1608   Patient Observation   Pulse 89   Respirations 18   SpO2 100 %   Breath Sounds   Right Upper Lobe Diminished   Right Middle Lobe Diminished   Right Lower Lobe Diminished   Left Upper Lobe Diminished   Left Lower Lobe Diminished   Vent Information   Vent Mode AC/PRVC   Ventilator Settings   FiO2  (S)  40 %   Resp Rate (Set) 18 bpm   PEEP/CPAP (cmH2O) 5   Target Vt 460   Vent Patient Data (Readings)   Vt (Measured) 498 mL   Peak Inspiratory Pressure (cmH2O) 27 cmH2O   Rate Measured 18 br/min   Minute Volume (L/min) 8.53 Liters   Mean Airway Pressure (cmH2O) 12 cmH20   I:E Ratio 1:2.2   Backup Apnea On   Backup Rate 18 Breaths Per Minute   Backup Vt 400   Vent Alarm Settings   High Pressure (cmH2O) 50 cmH2O   Low Minute Volume (lpm) 2 L/min   Low Exhaled Vt (ml) 200 mL   RR High (bpm) 40 br/min   Apnea (secs) 20 secs   Additional Respiratoray Assessments   Humidification Source HME   Ambu Bag With Mask At Bedside Yes   Airway Clearance   Suction ET Tube   Suction Device Inline suction catheter   Sputum Method Obtained Endotracheal   Sputum Amount Small   Sputum Color/Odor Clear   Sputum Consistency Thin   Non-Surgical Airway 06/16/24 Endotracheal tube   Placement Date/Time: 06/16/24 0447   Placed By: (c) Other (comment)  Location: Oral  Airway Device: Endotracheal tube  Size: 7   Secured At 23 cm   Measured From Lips   Secured Location Right   Secured By Commercial tube song   Site Assessment Dry

## 2024-06-16 NOTE — PROGRESS NOTES
06/16/24 1807   RT Protocol   History Pulmonary Disease 2   Respiratory pattern 6   Breath sounds 4   Cough 1   Indications for Bronchodilator Therapy On home bronchodilators   Bronchodilator Assessment Score 13

## 2024-06-16 NOTE — PROGRESS NOTES
06/16/24 0721   Patient Observation   Pulse (!) 113   Respirations 22   SpO2 100 %   Breath Sounds   Right Upper Lobe Rhonchi;Expiratory wheezes   Right Middle Lobe Rhonchi;Expiratory wheezes   Right Lower Lobe Expiratory wheezes   Left Upper Lobe Diminished;Expiratory wheezes   Left Lower Lobe Diminished   Vent Information   Vent Mode AC/PC   Ventilator Settings   FiO2  70 %   Insp Time (sec) 0.9 sec   Resp Rate (Set) 22 bpm   Set Pressure 30   PEEP/CPAP (cmH2O) 5   Vent Patient Data (Readings)   Vt (Measured) 416 mL   Peak Inspiratory Pressure (cmH2O) 35 cmH2O   Rate Measured 22 br/min   Minute Volume (L/min) 9.2 Liters   Mean Airway Pressure (cmH2O) 15 cmH20   Inspiratory Time 0.9 sec   I:E Ratio 1:2   I Time/ I Time % 0.9 s   Backup Apnea On   Backup Rate 18 Breaths Per Minute   Backup Vt 400   Vent Alarm Settings   High Pressure (cmH2O) 50 cmH2O   Low Minute Volume (lpm) 2 L/min   Low Exhaled Vt (ml) 200 mL   RR High (bpm) 40 br/min   Apnea (secs) 20 secs   Airway Clearance   Suction ET Tube   Suction Device Inline suction catheter   Sputum Method Obtained Endotracheal   Sputum Color/Odor White;Tan   Sputum Consistency Thick   Non-Surgical Airway 06/16/24 Endotracheal tube   Placement Date/Time: 06/16/24 0447   Placed By: (c) Other (comment)  Location: Oral  Airway Device: Endotracheal tube  Size: 7   Secured At 21 cm   Measured From Lips   Secured Location Center   Secured By Commercial tube song   Cuff Pressure 32 cm H2O   Patient Transport   Time Spent Transporting 16-30   Transport Ventillation Type Transport vent   Transport From ER   Transport Destination CT scan   Transport Destination CT scan   Transport Destination ER   Emergency Equipment Included Yes

## 2024-06-16 NOTE — PROGRESS NOTES
Pt decorticate posturing to painful stimuli. Myoclonic movements noted throughout shift. Critical care aware.

## 2024-06-16 NOTE — PROGRESS NOTES
06/16/24 1057   Patient Observation   Pulse (!) 113   Respirations (!) 45   SpO2 100 %   Vent Information   Vent Mode AC/PRVC   $Ventilation $Initial Day   Ventilator Settings   FiO2  50 %   Insp Time (sec) 1.05 sec   Resp Rate (Set) 18 bpm   PEEP/CPAP (cmH2O) 5   Target Vt 460   Vent Patient Data (Readings)   Vt (Measured) 460 mL   Peak Inspiratory Pressure (cmH2O) 46 cmH2O   Rate Measured 24 br/min   Minute Volume (L/min) 11.2 Liters   Mean Airway Pressure (cmH2O) 21 cmH20   I:E Ratio 1:2.2   Backup Apnea On   Backup Rate 18 Breaths Per Minute   Backup Vt 400   Vent Alarm Settings   High Pressure (cmH2O) 50 cmH2O   Low Minute Volume (lpm) 2 L/min   Low Exhaled Vt (ml) 200 mL   RR High (bpm) 40 br/min   Apnea (secs) 20 secs   Airway Clearance   Suction ET Tube   Suction Device Inline suction catheter   Sputum Amount Large   Sputum Color/Odor Yellow;Tan   Sputum Consistency Thick   Non-Surgical Airway 06/16/24 Endotracheal tube   Placement Date/Time: 06/16/24 0447   Placed By: (c) Other (comment)  Location: Oral  Airway Device: Endotracheal tube  Size: 7   Secured At 23 cm   Measured From Lips   Secured Location Left   Secured By Commercial tube song   Site Assessment Dry

## 2024-06-16 NOTE — PROGRESS NOTES
Patient not ventilating on volume control, switched to pressure control.  No breath sounds heard on left side, ETT withdrawn to 21 cm lipline.  Breath sounds still absent on left, wheezes heard throughout right lung.  CXR ordered.

## 2024-06-17 ENCOUNTER — APPOINTMENT (OUTPATIENT)
Dept: MRI IMAGING | Age: 64
DRG: 871 | End: 2024-06-17
Payer: COMMERCIAL

## 2024-06-17 PROBLEM — G93.1 ANOXIC BRAIN INJURY (HCC): Status: ACTIVE | Noted: 2024-06-17

## 2024-06-17 LAB
ANION GAP SERPL CALCULATED.3IONS-SCNC: 17 MMOL/L (ref 3–16)
BASE EXCESS BLDA CALC-SCNC: -2.6 MMOL/L (ref -3–3)
BASOPHILS # BLD: 0 K/UL (ref 0–0.2)
BASOPHILS NFR BLD: 0.1 %
BUN SERPL-MCNC: 28 MG/DL (ref 7–20)
CALCIUM SERPL-MCNC: 8.4 MG/DL (ref 8.3–10.6)
CHLORIDE SERPL-SCNC: 106 MMOL/L (ref 99–110)
CK SERPL-CCNC: 830 U/L (ref 26–192)
CK SERPL-CCNC: 885 U/L (ref 26–192)
CO2 BLDA-SCNC: 22.4 MMOL/L
CO2 SERPL-SCNC: 18 MMOL/L (ref 21–32)
COHGB MFR BLDA: 0.4 % (ref 0–1.5)
CREAT SERPL-MCNC: <0.5 MG/DL (ref 0.6–1.2)
DEPRECATED RDW RBC AUTO: 14.8 % (ref 12.4–15.4)
EOSINOPHIL # BLD: 0 K/UL (ref 0–0.6)
EOSINOPHIL NFR BLD: 0 %
EST. AVERAGE GLUCOSE BLD GHB EST-MCNC: 119.8 MG/DL
GFR SERPLBLD CREATININE-BSD FMLA CKD-EPI: >90 ML/MIN/{1.73_M2}
GLUCOSE BLD-MCNC: 127 MG/DL (ref 70–99)
GLUCOSE BLD-MCNC: 134 MG/DL (ref 70–99)
GLUCOSE BLD-MCNC: 137 MG/DL (ref 70–99)
GLUCOSE BLD-MCNC: 153 MG/DL (ref 70–99)
GLUCOSE BLD-MCNC: 167 MG/DL (ref 70–99)
GLUCOSE BLD-MCNC: 168 MG/DL (ref 70–99)
GLUCOSE SERPL-MCNC: 149 MG/DL (ref 70–99)
HBA1C MFR BLD: 5.8 %
HCO3 BLDA-SCNC: 21.3 MMOL/L (ref 21–29)
HCT VFR BLD AUTO: 42.7 % (ref 36–48)
HGB BLD-MCNC: 13.5 G/DL (ref 12–16)
HGB BLDA-MCNC: 13.2 G/DL (ref 12–16)
LACTATE BLDV-SCNC: 2.5 MMOL/L (ref 0.4–2)
LACTATE BLDV-SCNC: 2.9 MMOL/L (ref 0.4–2)
LYMPHOCYTES # BLD: 1 K/UL (ref 1–5.1)
LYMPHOCYTES NFR BLD: 5.8 %
MAGNESIUM SERPL-MCNC: 2.4 MG/DL (ref 1.8–2.4)
MCH RBC QN AUTO: 31.1 PG (ref 26–34)
MCHC RBC AUTO-ENTMCNC: 31.5 G/DL (ref 31–36)
MCV RBC AUTO: 98.7 FL (ref 80–100)
METHGB MFR BLDA: 0.3 %
MONOCYTES # BLD: 1 K/UL (ref 0–1.3)
MONOCYTES NFR BLD: 6 %
NEUTROPHILS # BLD: 15.2 K/UL (ref 1.7–7.7)
NEUTROPHILS NFR BLD: 88.1 %
O2 THERAPY: ABNORMAL
PATH INTERP BLD-IMP: NORMAL
PCO2 BLDA: 34.5 MMHG (ref 35–45)
PERFORMED ON: ABNORMAL
PH BLDA: 7.41 [PH] (ref 7.35–7.45)
PHOSPHATE SERPL-MCNC: 3.2 MG/DL (ref 2.5–4.9)
PLATELET # BLD AUTO: 214 K/UL (ref 135–450)
PMV BLD AUTO: 8.9 FL (ref 5–10.5)
PO2 BLDA: 84.3 MMHG (ref 75–108)
POTASSIUM SERPL-SCNC: 4.3 MMOL/L (ref 3.5–5.1)
PROCALCITONIN SERPL IA-MCNC: 2.27 NG/ML (ref 0–0.15)
RBC # BLD AUTO: 4.33 M/UL (ref 4–5.2)
SAO2 % BLDA: 96.9 %
SODIUM SERPL-SCNC: 141 MMOL/L (ref 136–145)
TRIGL SERPL-MCNC: 185 MG/DL (ref 0–150)
TROPONIN, HIGH SENSITIVITY: 32 NG/L (ref 0–14)
TROPONIN, HIGH SENSITIVITY: 35 NG/L (ref 0–14)
WBC # BLD AUTO: 17.3 K/UL (ref 4–11)

## 2024-06-17 PROCEDURE — 6370000000 HC RX 637 (ALT 250 FOR IP): Performed by: NURSE PRACTITIONER

## 2024-06-17 PROCEDURE — 6360000002 HC RX W HCPCS: Performed by: NURSE PRACTITIONER

## 2024-06-17 PROCEDURE — 2580000003 HC RX 258: Performed by: INTERNAL MEDICINE

## 2024-06-17 PROCEDURE — 82803 BLOOD GASES ANY COMBINATION: CPT

## 2024-06-17 PROCEDURE — 2700000000 HC OXYGEN THERAPY PER DAY

## 2024-06-17 PROCEDURE — 84478 ASSAY OF TRIGLYCERIDES: CPT

## 2024-06-17 PROCEDURE — 94761 N-INVAS EAR/PLS OXIMETRY MLT: CPT

## 2024-06-17 PROCEDURE — 82550 ASSAY OF CK (CPK): CPT

## 2024-06-17 PROCEDURE — 6360000002 HC RX W HCPCS: Performed by: INTERNAL MEDICINE

## 2024-06-17 PROCEDURE — 84484 ASSAY OF TROPONIN QUANT: CPT

## 2024-06-17 PROCEDURE — 2500000003 HC RX 250 WO HCPCS: Performed by: INTERNAL MEDICINE

## 2024-06-17 PROCEDURE — 6370000000 HC RX 637 (ALT 250 FOR IP): Performed by: INTERNAL MEDICINE

## 2024-06-17 PROCEDURE — 94003 VENT MGMT INPAT SUBQ DAY: CPT

## 2024-06-17 PROCEDURE — 94640 AIRWAY INHALATION TREATMENT: CPT

## 2024-06-17 PROCEDURE — 2580000003 HC RX 258: Performed by: NURSE PRACTITIONER

## 2024-06-17 PROCEDURE — 85025 COMPLETE CBC W/AUTO DIFF WBC: CPT

## 2024-06-17 PROCEDURE — 84145 PROCALCITONIN (PCT): CPT

## 2024-06-17 PROCEDURE — 70551 MRI BRAIN STEM W/O DYE: CPT

## 2024-06-17 PROCEDURE — 36415 COLL VENOUS BLD VENIPUNCTURE: CPT

## 2024-06-17 PROCEDURE — 2000000000 HC ICU R&B

## 2024-06-17 PROCEDURE — 83605 ASSAY OF LACTIC ACID: CPT

## 2024-06-17 PROCEDURE — 84100 ASSAY OF PHOSPHORUS: CPT

## 2024-06-17 PROCEDURE — 87641 MR-STAPH DNA AMP PROBE: CPT

## 2024-06-17 PROCEDURE — 99291 CRITICAL CARE FIRST HOUR: CPT | Performed by: INTERNAL MEDICINE

## 2024-06-17 PROCEDURE — 80048 BASIC METABOLIC PNL TOTAL CA: CPT

## 2024-06-17 PROCEDURE — 83735 ASSAY OF MAGNESIUM: CPT

## 2024-06-17 RX ORDER — IPRATROPIUM BROMIDE AND ALBUTEROL SULFATE 2.5; .5 MG/3ML; MG/3ML
1 SOLUTION RESPIRATORY (INHALATION)
Status: DISCONTINUED | OUTPATIENT
Start: 2024-06-17 | End: 2024-06-18

## 2024-06-17 RX ORDER — FENTANYL CITRATE-0.9 % NACL/PF 20 MCG/2ML
50 SYRINGE (ML) INTRAVENOUS EVERY 30 MIN PRN
Status: DISCONTINUED | OUTPATIENT
Start: 2024-06-17 | End: 2024-06-17

## 2024-06-17 RX ORDER — FENTANYL CITRATE-0.9 % NACL/PF 10 MCG/ML
25-200 PLASTIC BAG, INJECTION (ML) INTRAVENOUS CONTINUOUS
Status: DISCONTINUED | OUTPATIENT
Start: 2024-06-17 | End: 2024-06-17

## 2024-06-17 RX ORDER — MINERAL OIL, PETROLATUM 425; 568 MG/G; MG/G
OINTMENT OPHTHALMIC
Status: DISCONTINUED | OUTPATIENT
Start: 2024-06-17 | End: 2024-06-18

## 2024-06-17 RX ORDER — PREDNISONE 20 MG/1
40 TABLET ORAL DAILY
Status: DISCONTINUED | OUTPATIENT
Start: 2024-06-18 | End: 2024-06-18

## 2024-06-17 RX ORDER — HYDRALAZINE HYDROCHLORIDE 20 MG/ML
10 INJECTION INTRAMUSCULAR; INTRAVENOUS EVERY 4 HOURS PRN
Status: DISCONTINUED | OUTPATIENT
Start: 2024-06-17 | End: 2024-06-20 | Stop reason: HOSPADM

## 2024-06-17 RX ADMIN — IPRATROPIUM BROMIDE AND ALBUTEROL SULFATE 1 DOSE: 2.5; .5 SOLUTION RESPIRATORY (INHALATION) at 15:15

## 2024-06-17 RX ADMIN — SODIUM CHLORIDE 5 MG/HR: 9 INJECTION, SOLUTION INTRAVENOUS at 15:28

## 2024-06-17 RX ADMIN — MUPIROCIN: 20 OINTMENT TOPICAL at 08:18

## 2024-06-17 RX ADMIN — IPRATROPIUM BROMIDE AND ALBUTEROL SULFATE 1 DOSE: 2.5; .5 SOLUTION RESPIRATORY (INHALATION) at 07:34

## 2024-06-17 RX ADMIN — HYDRALAZINE HYDROCHLORIDE 10 MG: 20 INJECTION INTRAMUSCULAR; INTRAVENOUS at 09:18

## 2024-06-17 RX ADMIN — WHITE PETROLATUM 57.7 %-MINERAL OIL 31.9 % EYE OINTMENT: at 11:25

## 2024-06-17 RX ADMIN — CHLORHEXIDINE GLUCONATE, 0.12% ORAL RINSE 15 ML: 1.2 SOLUTION DENTAL at 20:07

## 2024-06-17 RX ADMIN — WHITE PETROLATUM 57.7 %-MINERAL OIL 31.9 % EYE OINTMENT: at 17:13

## 2024-06-17 RX ADMIN — WATER 40 MG: 1 INJECTION INTRAMUSCULAR; INTRAVENOUS; SUBCUTANEOUS at 20:07

## 2024-06-17 RX ADMIN — IPRATROPIUM BROMIDE AND ALBUTEROL SULFATE 1 DOSE: 2.5; .5 SOLUTION RESPIRATORY (INHALATION) at 12:13

## 2024-06-17 RX ADMIN — WHITE PETROLATUM 57.7 %-MINERAL OIL 31.9 % EYE OINTMENT: at 23:32

## 2024-06-17 RX ADMIN — SODIUM CHLORIDE: 9 INJECTION, SOLUTION INTRAVENOUS at 03:49

## 2024-06-17 RX ADMIN — FAMOTIDINE 20 MG: 10 INJECTION, SOLUTION INTRAVENOUS at 20:07

## 2024-06-17 RX ADMIN — ENOXAPARIN SODIUM 40 MG: 100 INJECTION SUBCUTANEOUS at 08:16

## 2024-06-17 RX ADMIN — WHITE PETROLATUM 57.7 %-MINERAL OIL 31.9 % EYE OINTMENT: at 08:17

## 2024-06-17 RX ADMIN — CHLORHEXIDINE GLUCONATE, 0.12% ORAL RINSE 15 ML: 1.2 SOLUTION DENTAL at 08:16

## 2024-06-17 RX ADMIN — SODIUM CHLORIDE 15 MG/HR: 9 INJECTION, SOLUTION INTRAVENOUS at 22:46

## 2024-06-17 RX ADMIN — SODIUM CHLORIDE, PRESERVATIVE FREE 10 ML: 5 INJECTION INTRAVENOUS at 20:08

## 2024-06-17 RX ADMIN — IPRATROPIUM BROMIDE AND ALBUTEROL SULFATE 1 DOSE: 2.5; .5 SOLUTION RESPIRATORY (INHALATION) at 19:55

## 2024-06-17 RX ADMIN — Medication 1 CAPSULE: at 17:13

## 2024-06-17 RX ADMIN — CEFEPIME 2000 MG: 2 INJECTION, POWDER, FOR SOLUTION INTRAVENOUS at 08:29

## 2024-06-17 RX ADMIN — MUPIROCIN: 20 OINTMENT TOPICAL at 20:08

## 2024-06-17 RX ADMIN — HYDRALAZINE HYDROCHLORIDE 10 MG: 20 INJECTION INTRAMUSCULAR; INTRAVENOUS at 14:07

## 2024-06-17 RX ADMIN — CEFEPIME 2000 MG: 2 INJECTION, POWDER, FOR SOLUTION INTRAVENOUS at 17:14

## 2024-06-17 RX ADMIN — VANCOMYCIN HYDROCHLORIDE 750 MG: 750 INJECTION, POWDER, LYOPHILIZED, FOR SOLUTION INTRAVENOUS at 00:02

## 2024-06-17 RX ADMIN — PROPOFOL 30 MCG/KG/MIN: 10 INJECTION, EMULSION INTRAVENOUS at 06:18

## 2024-06-17 RX ADMIN — Medication 1 CAPSULE: at 08:16

## 2024-06-17 RX ADMIN — WHITE PETROLATUM 57.7 %-MINERAL OIL 31.9 % EYE OINTMENT: at 20:08

## 2024-06-17 RX ADMIN — SODIUM CHLORIDE 15 MG/HR: 9 INJECTION, SOLUTION INTRAVENOUS at 21:19

## 2024-06-17 RX ADMIN — SODIUM CHLORIDE 10 MG/HR: 9 INJECTION, SOLUTION INTRAVENOUS at 18:41

## 2024-06-17 RX ADMIN — Medication 75 MCG/HR: at 06:58

## 2024-06-17 RX ADMIN — FAMOTIDINE 20 MG: 10 INJECTION, SOLUTION INTRAVENOUS at 08:17

## 2024-06-17 RX ADMIN — SODIUM CHLORIDE, PRESERVATIVE FREE 10 ML: 5 INJECTION INTRAVENOUS at 08:20

## 2024-06-17 RX ADMIN — WATER 40 MG: 1 INJECTION INTRAMUSCULAR; INTRAVENOUS; SUBCUTANEOUS at 08:17

## 2024-06-17 ASSESSMENT — PULMONARY FUNCTION TESTS
PIF_VALUE: 20
PIF_VALUE: 18
PIF_VALUE: 21
PIF_VALUE: 18
PIF_VALUE: 24
PIF_VALUE: 19
PIF_VALUE: 22
PIF_VALUE: 22
PIF_VALUE: 21
PIF_VALUE: 20
PIF_VALUE: 19
PIF_VALUE: 26
PIF_VALUE: 18
PIF_VALUE: 22
PIF_VALUE: 23
PIF_VALUE: 19
PIF_VALUE: 21
PIF_VALUE: 19
PIF_VALUE: 49
PIF_VALUE: 22
PIF_VALUE: 20
PIF_VALUE: 21
PIF_VALUE: 19
PIF_VALUE: 17
PIF_VALUE: 19
PIF_VALUE: 18
PIF_VALUE: 18
PIF_VALUE: 20
PIF_VALUE: 19
PIF_VALUE: 46
PIF_VALUE: 20
PIF_VALUE: 17
PIF_VALUE: 20
PIF_VALUE: 18
PIF_VALUE: 19
PIF_VALUE: 18
PIF_VALUE: 17
PIF_VALUE: 21
PIF_VALUE: 24
PIF_VALUE: 17
PIF_VALUE: 19
PIF_VALUE: 18
PIF_VALUE: 21

## 2024-06-17 NOTE — CONSULTS
Comprehensive Nutrition Assessment    Type and Reason for Visit:  Initial, Consult    Nutrition Recommendations/Plan:   Initiate Vital AF (Peptide based formula) at 10 mL/hr and as tolerated, increase by 10 mL/hr q 4 hours until goal of 55 mL/hr is met via N/G  Recommend 60 mL H20 flush q 4 hours.  Monitor IVF infusion, Na labs and need for adjustments in water flush   Monitor TF tolerance (abd distention, bowel habits, N/V, cramping)  Check TG while on diprivan infusion, RD to order updated TG  Monitor nutrition adequacy, pertinent labs, bowel habits, wt changes, and clinical progress     Malnutrition Assessment:  Malnutrition Status:  At risk for malnutrition (Comment) (06/17/24 0909)    Context:  Acute Illness     Findings of the 6 clinical characteristics of malnutrition:  Energy Intake:  Mild decrease in energy intake (Comment)    Nutrition Assessment:    Consult for TF order and mangement: 63 y.o. w/ PMHx sig for severe COPD admitted w/ cardiac arrest due to severe COPD. NPO. Continues on vent support. Sedated on fentanyl and propofol at 11.1 ml/hr to provide 293 kcals from lipids. ADELA wt changes d/t lack of recent wt in EMR. Plan to start TFs. Recommendations included, will continue to monitor.    Nutrition Related Findings:    Generalized + 1 non-pitting edema. Labs reviewed. -261 x 24 hours. No BM Wound Type: None       Current Nutrition Intake & Therapies:    Average Meal Intake: NPO  Average Supplements Intake: NPO  Diet NPO Exceptions are: Sips of Water with Meds  Current Tube Feeding (TF) Orders:  Feeding Route: Nasogastric  Formula: Peptide Based  Schedule: Continuous  Goal TF & Flush Orders Provides: Vital AF x 20 hours at goal rate of 55 ml/hr to provide 1100 ml TV, 1320 kals, 83 g protein and 892 ml free water. Free water flush 60 ml q 4 hours.    Anthropometric Measures:  Height: 160 cm (5' 3\")  Ideal Body Weight (IBW): 115 lbs (52 kg)       Current Body Weight: 75.3 kg (166 lb), 144.3 % IBW.

## 2024-06-17 NOTE — PROGRESS NOTES
Spontaneous Awakening Trial    Patient meets criteria for spontaneous awakening trial. Started at 0657 hours.     Sedation is currently reduced.     Current RASS score is  -4 for ventilator synchrony .     Safety assessed. Restraints not needed at this time.

## 2024-06-17 NOTE — CONSULTS
Palliative Care Initial Note  Palliative Care Admit date:  6/17/24    ACP/palcare referral noted.   Chart reviewed

## 2024-06-17 NOTE — PROGRESS NOTES
Shift: [id number]     Admitting diagnosis: cardiac arrest    Presentation to hospital: EMS called for SOB (suspected COPD exacerbation) Went unresponsive, estimated 10 min down time prior to arrival. Was asystole upon EMS arrival, epi x1, CPR x20 minutes then obtained ROSC.     Surgery: no     Nursing assessment at handoff  stable    Emergency Contact/POA:SonRell  Family updated: yes     Most recent vitals: BP (!) 154/67   Pulse (!) 126   Temp 100.3 °F (37.9 °C) (Bladder)   Resp 19   Ht 1.6 m (5' 3\")   Wt 75.5 kg (166 lb 7.2 oz)   LMP 08/25/2011   SpO2 98%   BMI 29.48 kg/m²      Rhythm: Normal Sinus Rhythm 70-90's     NC/HFNC- 0 lpm  Respiratory support: - Ventilator Settings:    Vt (Set, mL): 460 mL  Resp Rate (Set): 18 bpm  FiO2 : 30 %    PEEP/CPAP (cmH2O): 5       Vent days: Day 1    Increased O2 requirements: no    Admission weight Weight - Scale: 83.9 kg (185 lb)  Today's weight   Wt Readings from Last 1 Encounters:   06/17/24 75.5 kg (166 lb 7.2 oz)         UOP >30ml/hr: no 30 ml/hr on average    De need assessed each shift: yes, critically ill    Restraints: no  Order current and documentation up to date?    Lines/Drains  LDA Insertion Date Discontinued Date Dressing Changes   PIV  6/16 x3     TLC       Arterial       De  6/16     Vas Cath      ETT  6/16     Surgical drains        Night Shift Hospitalist Interventions    Problem(Brief) Date Time Intervention Physician contacted                                               Drip rates at handoff:    niCARdipine 10 mg/hr (06/17/24 1653)    sodium chloride 5 mL/hr at 06/17/24 0232    dextrose         Hospital Course Daily Updates:    Admit Day# 0 Nights 6/16/24  -OPO notified  -200ml NG output  -360ml UOP  -intermittent eye opening to pain  -decorticating of arms noted with coughing     Admit Day #1 day 6/17/2024  Sedation turned off  MRI done- shows anoxic brain injury  Code status changed  No improvements in neuro status  Cardene started due to

## 2024-06-17 NOTE — PROGRESS NOTES
06/17/24 1517   Patient Observation   Pulse (!) 116   Respirations 18   Vent Information   Vent Mode AC/PRVC   Ventilator Settings   FiO2  30 %   Resp Rate (Set) 18 bpm   PEEP/CPAP (cmH2O) 5   Vt (Set, mL) 460 mL   Vent Patient Data (Readings)   Vt (Measured) 479 mL   Peak Inspiratory Pressure (cmH2O) 17 cmH2O   Rate Measured 18 br/min   Minute Volume (L/min) 8.64 Liters   Mean Airway Pressure (cmH2O) 8.9 cmH20   Plateau Pressure (cm H2O) 19 cm H2O   Driving Pressure 14   I:E Ratio 1:2.20   Flow Sensitivity 3 L/min   I Time/ I Time % 1 s   Vent Alarm Settings   High Pressure (cmH2O) 50 cmH2O   Low Minute Volume (lpm) 2 L/min   High Minute Volume (lpm) 20 L/min   Low Exhaled Vt (ml) 200 mL   High Exhaled Vt (ml) 1100 mL   RR High (bpm) 40 br/min   Additional Respiratoray Assessments   Humidification Source HME   Airway Clearance   Suction ET Tube   Suction Device Inline suction catheter   Sputum Method Obtained Endotracheal   Sputum Amount Small   Sputum Color/Odor White;Pink tinged   Sputum Consistency Thick;Thin   Non-Surgical Airway 06/16/24 Endotracheal tube   Placement Date/Time: 06/16/24 0447   Placed By: (c) Other (comment)  Location: Oral  Airway Device: Endotracheal tube  Size: 7   Secured At 22 cm   Measured From Lips   Secured Location Left   Secured By Commercial tube song   Site Assessment Dry   Cuff Pressure 30 cm H2O

## 2024-06-17 NOTE — CARE COORDINATION
Case Management Assessment  Initial Evaluation    Date/Time of Evaluation: 6/17/2024 1:30 PM  Assessment Completed by: Nohemi Simms RN    If patient is discharged prior to next notation, then this note serves as note for discharge by case management.    Patient Name: Caterina Chavez                   YOB: 1960  Diagnosis: Cardiac arrest (HCC) [I46.9]  Acute exacerbation of chronic obstructive pulmonary disease (COPD) (HCC) [J44.1]  COPD with acute exacerbation (HCC) [J44.1]  Hypertensive emergency [I16.1]  Acute respiratory failure with hypoxia and hypercapnia (HCC) [J96.01, J96.02]  Multifocal pneumonia [J18.9]                   Date / Time: 6/16/2024  4:45 AM    Patient Admission Status: Inpatient   Readmission Risk (Low < 19, Mod (19-27), High > 27): Readmission Risk Score: 12.9    Current PCP: Marcelo Bardley III, MD  PCP verified by CM? Yes    Chart Reviewed: Yes      History Provided by: Significant Other, Medical Record  Patient Orientation: Unresponsive (on a vent)    Patient Cognition: Other (see comment) (vent)    Hospitalization in the last 30 days (Readmission):  No    If yes, Readmission Assessment in  Navigator will be completed.    Advance Directives:      Code Status: Full Code   Patient's Primary Decision Maker is: Legal Next of Kin    Primary Decision Maker: saira granger - [place] - [phone]    Discharge Planning:    Patient lives with: Spouse/Significant Other, Children Type of Home: House  Primary Care Giver: Self  Patient Support Systems include: Spouse/Significant Other, Children   Current Financial resources:  (BCBS)  Current community resources:    Current services prior to admission: None            Current DME:              Type of Home Care services:  None    ADLS  Prior functional level: Independent in ADLs/IADLs  Current functional level: Assistance with the following:, Bathing, Dressing, Toileting, Feeding, Cooking, Housework, Shopping, Mobility    PT AM-PAC:    obstructive pulmonary disease (COPD) (HCC) [J44.1]  COPD with acute exacerbation (HCC) [J44.1]  Hypertensive emergency [I16.1]  Acute respiratory failure with hypoxia and hypercapnia (HCC) [J96.01, J96.02]  Multifocal pneumonia [J18.9]    IF APPLICABLE: The Patient and/or patient representative Caterina and her family were provided with a choice of provider and agrees with the discharge plan. Freedom of choice list with basic dialogue that supports the patient's individualized plan of care/goals and shares the quality data associated with the providers was provided to:     Patient Representative Name:       The Patient and/or Patient Representative Agree with the Discharge Plan?      Nohemi Simms RN  Case Management Department

## 2024-06-17 NOTE — PROGRESS NOTES
INPATIENT PULMONARY CRITICAL CARE PROGRESS NOTE      Reason for visit    acute respiratory failure on ventilator       SUBJECTIVE: Patient when seen this morning continues to be critically ill on mechanical vent support, patient apparently was having some jerky movements last evening as per nursing, patient continues to be on IV sedation to maintain patient ventilator synchrony when seen this morning, patient has had a Tmax of 99.9 °F, patient has sinus rhythm on the monitor, patient patient's blood sugars were acceptable, patient's urine output was adequate, patient has a cumulative fluid balance of +717 mL, no other pertinent review of system of concern           Physical Exam:  Blood pressure 131/63, pulse 95, temperature 98.9 °F (37.2 °C), temperature source Bladder, resp. rate 18, height 1.6 m (5' 3\"), weight 75.5 kg (166 lb 7.2 oz), last menstrual period 08/25/2011, SpO2 100 %.'   Constitutional:  No acute distress.on mechanical ventilatory support  HENT:  Oropharynx is clear and moist. No thyromegaly.  Eyes:  Conjunctivae are suffused. Pupils appear to be fixed and dilated, No scleral icterus.   Neck: . No tracheal deviation present. No obvious thyroid mass.   Cardiovascular: Normal rate, regular rhythm, normal heart sounds.  No right ventricular heave. No lower extremity edema.  Pulmonary/Chest: No wheezes.  Decreased B/L rales.  Chest wall is not dull to percussion.  No accessory muscle usage or stridor. Decreased BSI  Abdominal: Soft. Bowel sounds present. No distension or hernia. No tenderness.    Musculoskeletal: No cyanosis. No clubbing. No obvious joint deformity.   Lymphadenopathy: No cervical or supraclavicular adenopathy.   Skin: Skin is warm and dry. No rash or nodules on the exposed extremities.  Neurologic: Intubated and sedated          Results:  CBC:   Recent Labs     06/16/24  0458 06/17/24  0444   WBC 16.4* 17.3*   HGB 14.3 13.5   HCT 45.4 42.7   MCV 99.7 98.7    214     BMP:   Recent

## 2024-06-17 NOTE — PROGRESS NOTES
06/16/24 2339   Patient Observation   Pulse 88   Respirations 18   SpO2 99 %   Breath Sounds   Right Upper Lobe End expiratory wheezes   Right Middle Lobe Diminished   Right Lower Lobe Diminished   Left Upper Lobe Diminished   Left Lower Lobe Fine crackles   Vent Information   Vent Mode AC/PRVC   Ventilator Settings   FiO2  30 %   Insp Time (sec) 1.05 sec   Resp Rate (Set) 18 bpm   PEEP/CPAP (cmH2O) 5   Vt (Set, mL) 460 mL   Vent Patient Data (Readings)   Vt (Measured) 480 mL   Peak Inspiratory Pressure (cmH2O) 21 cmH2O   Rate Measured 18 br/min   Minute Volume (L/min) 8.64 Liters   Mean Airway Pressure (cmH2O) 9.9 cmH20   Plateau Pressure (cm H2O) 0 cm H2O   Driving Pressure -5   I:E Ratio 1:2.20   I Time/ I Time % 1.05 s   Vent Alarm Settings   High Pressure (cmH2O) 50 cmH2O   Low Minute Volume (lpm) 2 L/min   Low Exhaled Vt (ml) 200 mL   RR High (bpm) 40 br/min   Additional Respiratoray Assessments   Humidification Source HME   Ambu Bag With Mask At Bedside Yes   Airway Clearance   Suction ET Tube   Suction Device Inline suction catheter   Sputum Method Obtained Endotracheal   Sputum Amount Scant   Sputum Color/Odor White   Sputum Consistency Thin   Non-Surgical Airway 06/16/24 Endotracheal tube   Placement Date/Time: 06/16/24 0447   Placed By: (c) Other (comment)  Location: Oral  Airway Device: Endotracheal tube  Size: 7   Secured At 22 cm   Measured From Lips   Secured Location Center   Secured By Commercial tube song   Site Assessment Dry   Cuff Pressure 30 cm H2O

## 2024-06-17 NOTE — FLOWSHEET NOTE
.Palliative Care Screening Tool (PCST)        COMORBIDITIES Component Value (YES = 1 POINT / NO = 0 POINTS)   Dementia yes and no numerical 1: 0   End Stage Renal Failure Yes or no: 0   CHF Yes or no: 0   COPD/Interstitial Lung Disease Yes or no: 1   Cancer: metastatic or recurrent Yes or no: 0   End Stage Liver Failure Yes or no: 0   Cardiac Arrest Yes or no: 1   Progressive Neurologic Disease Yes or no: 0   Failure to Thrive / Debility Yes or no: 0   Chronic Tracheostomy Yes or no: 0   Chronic Pain Yes or no: 0   Class III Obesity BMI>40 Yes or no: 0   COMORBIDITY SCORE 2             CONTRIBUTING FACTORS Component Value (YES = 2 POINTS / NO = 0 POINTS)   Previous ICU admission in last 3 months  Yes = 2 pts yes or no: 0   Readmission to hospital within 30 days of discharge  Yes = 2 pts yes or no: 0   CONTRIBUTING FACTORS Component Value  (YES = 1 POINT / NO = 0 POINTS)   Transfer to ICU for deterioration Yes or no: 0   Nursing home patient or bedbound Yes or no: 0   Limited Code Status Yes or no: 0   Family/patient requesting assistance with goals of care Yes or no: 0   Enteral Feeding or tracheostomy tube being considered Yes or no: 0   Failed swallow evaluation or history of recurrent aspiration Yes or no: 0   Social/Economic/Emotional barriers limiting access to care Yes or no: 0   CONTRIBUTING FACTOR SCORE 0       TOTAL PCST SCORE FROM BOTH COLUMNS 2       TOTAL PCST SCORE FROM BOTH COLUMNS:    2    TOTAL SCORE OF 4 OR GREATER INDICATES CONSIDERATION FOR REFERRAL TO PALLIATIVE CARE  REASON FOR CONSULT: \"+PCST ON ICU ADMISSION\"    Abbreviations:  CHF = Congestive Heart Failure  COPD = Chronic Obstructive Pulmonary Disease  BMI = Body Mass Index        Electronically signed by TROY Ramos CNP on 6/17/2024 at 10:13 AM

## 2024-06-17 NOTE — PROGRESS NOTES
06/17/24 0415   Patient Observation   Pulse 77   Respirations 18   SpO2 99 %   Vent Information   Equipment Changed HME   Vent Mode AC/PRVC   Ventilator Settings   FiO2  30 %   Resp Rate (Set) 18 bpm   PEEP/CPAP (cmH2O) 5   Vt (Set, mL) 460 mL   Vent Patient Data (Readings)   Vt (Measured) 484 mL   Peak Inspiratory Pressure (cmH2O) 22 cmH2O   Rate Measured 18 br/min   Minute Volume (L/min) 8.71 Liters   Mean Airway Pressure (cmH2O) 10 cmH20   Plateau Pressure (cm H2O) 0 cm H2O   Driving Pressure -5   I:E Ratio 1:2.20   I Time/ I Time % 1.05 s   Vent Alarm Settings   High Pressure (cmH2O) 50 cmH2O   Low Minute Volume (lpm) 2 L/min   Low Exhaled Vt (ml) 200 mL   RR High (bpm) 40 br/min   Additional Respiratoray Assessments   Humidification Source HME   Ambu Bag With Mask At Bedside Yes   Airway Clearance   Suction ET Tube   Suction Device Inline suction catheter   Sputum Method Obtained Endotracheal   Sputum Amount Scant   Sputum Color/Odor Clear   Sputum Consistency Thin   Non-Surgical Airway 06/16/24 Endotracheal tube   Placement Date/Time: 06/16/24 0447   Placed By: (c) Other (comment)  Location: Oral  Airway Device: Endotracheal tube  Size: 7   Secured At 22 cm   Measured From Lips   Secured Location Center   Secured By Commercial tube song   Site Assessment Dry   Cuff Pressure 34 cm H2O

## 2024-06-17 NOTE — PROGRESS NOTES
06/16/24 2031   Patient Observation   Pulse 92   Respirations 18   SpO2 98 %   Breath Sounds   Right Upper Lobe End expiratory wheezes   Right Middle Lobe Diminished   Right Lower Lobe Fine crackles   Left Upper Lobe End expiratory wheezes   Left Lower Lobe Fine crackles   Vent Information   Vent Mode AC/PRVC   Ventilator Settings   FiO2  30 %   Insp Time (sec) 1.05 sec   Resp Rate (Set) 18 bpm   PEEP/CPAP (cmH2O) 5   Vent Patient Data (Readings)   Vt (Measured) 478 mL   Peak Inspiratory Pressure (cmH2O) 21 cmH2O   Rate Measured 18 br/min   Minute Volume (L/min) 8.61 Liters   Mean Airway Pressure (cmH2O) 10 cmH20   Plateau Pressure (cm H2O) 0 cm H2O   Driving Pressure -5   I:E Ratio 1:2.20   I Time/ I Time % 1.05 s   Vent Alarm Settings   High Pressure (cmH2O) 50 cmH2O   Low Minute Volume (lpm) 2 L/min   Low Exhaled Vt (ml) 200 mL   RR High (bpm) 40 br/min   Additional Respiratoray Assessments   Humidification Source HME   Ambu Bag With Mask At Bedside Yes   Airway Clearance   Suction ET Tube   Suction Device Inline suction catheter   Sputum Method Obtained Endotracheal   Sputum Amount Small   Sputum Color/Odor White   Sputum Consistency Thin   Non-Surgical Airway 06/16/24 Endotracheal tube   Placement Date/Time: 06/16/24 0447   Placed By: (c) Other (comment)  Location: Oral  Airway Device: Endotracheal tube  Size: 7   Secured At 22 cm   Measured From Lips   Secured Location Left   Secured By Commercial tube song   Site Assessment Dry   Cuff Pressure 32 cm H2O

## 2024-06-17 NOTE — PROGRESS NOTES
06/17/24 0739   Patient Observation   Pulse 95   Respirations 18   SpO2 100 %   Vent Information   Vent Mode AC/PRVC   Ventilator Settings   FiO2  30 %   Insp Time (sec) 1.05 sec   Resp Rate (Set) 18 bpm   PEEP/CPAP (cmH2O) 5   Vt (Set, mL) 460 mL   Vent Patient Data (Readings)   Vt (Measured) 440 mL   Peak Inspiratory Pressure (cmH2O) 22 cmH2O   Rate Measured 18 br/min   Minute Volume (L/min) 7.92 Liters   Mean Airway Pressure (cmH2O) 11 cmH20   Plateau Pressure (cm H2O) 19 cm H2O   Driving Pressure 14   I:E Ratio 1:2.20   I Time/ I Time % 1 s   Vent Alarm Settings   High Pressure (cmH2O) 50 cmH2O   Low Minute Volume (lpm) 2 L/min   High Minute Volume (lpm) 20 L/min   Low Exhaled Vt (ml) 200 mL   High Exhaled Vt (ml) 1100 mL   RR High (bpm) 40 br/min   Additional Respiratoray Assessments   Humidification Source HME   Circuit Condensation Drained   Ambu Bag With Mask At Bedside Yes   Airway Clearance   Suction ET Tube   Suction Device Inline suction catheter   Sputum Method Obtained Endotracheal   Sputum Amount Scant   Sputum Color/Odor Clear   Sputum Consistency Thin   Non-Surgical Airway 06/16/24 Endotracheal tube   Placement Date/Time: 06/16/24 0447   Placed By: (c) Other (comment)  Location: Oral  Airway Device: Endotracheal tube  Size: 7   Secured At 22 cm   Measured From Lips   Secured Location Left   Secured By Commercial tube song   Site Assessment Dry   Cuff Pressure 30 cm H2O

## 2024-06-17 NOTE — PROGRESS NOTES
Hospital Medicine Progress Note      Date of Admission: 6/16/2024  Hospital Day: 2    Chief Admission Complaint:  Cardiac arrest      Subjective:  Remains on vent support. Jerking movements reported overnight. Unresponsive.     Telemetry (reviewed by me):      Presenting Admission History:       63 y.o. WF with PMHx sig for severe COPD who presents with cardiac arrest due to severe COPD after shampooing dog carpets at home.  She has been having a lot of pain with giving herself injections at home over the past few days.  She was sitting on the toilet yesterday, then called for her significant other with severe sx of breathing and then she passed out on the toilet.  While awaiting EMS, she lost her pulse.  They did obtain ROSC after about 20 minutes of CPR.  Head CT neg, Chest CT shows pneumomediastinum and fractured sternum consistent with CPR as well as aspiration pneumonia.  Pt intially had HTN emergency with BP of 230/130s now down to 115/60s.  Changed to fentanyl and versed drips.  Pt started on cefepime and vanco for aspiration pneumonia.     Assessment/Plan:      Cardiopulmonary arrest - likely due to severe COPD exacerbation. Concern for anoxic brain injury. Continue vent support, supportive care    Acute Respiratory Failure with Hypoxia, 2/2 COPD Exacerbation, likely aspiration pneumonia. Continue vent support.     Probable Anoxic Brain Injury - Brain MRI noted. Discussed goals of care with family (son and fiance). They anticipate pursuing comfort oriented care in the next 1-2 days given her prognosis. They wish for DNR status should she have another arrest.     Aspiration pneumonia - Continue empiric Abx    COPD Exacerbation - Continue bronchodilators, steroids, Abx.     Pneumomediastinum - likely due to CPR with fractured sternum. Monitor.     HTN emergency - Continue Cardene drip. Monitor.     Rheumatoid arthritis - on high dose steroids for now which should control sx    Physical Exam Performed:       General appearance: Intubated  Respiratory: On vent. Diminished.   Cardiovascular: Regular rate and rhythm, normal S1/S2, no murmurs. No JVD.     Abdomen: Soft, non-distended, normal bowel sounds.   Neuro: Unresponsive   Psychiatric: Intubated, unresponsive     /69   Pulse 83   Temp 99.2 °F (37.3 °C) (Bladder)   Resp 18   Ht 1.6 m (5' 3\")   Wt 75.5 kg (166 lb 7.2 oz)   LMP 08/25/2011   SpO2 99%   BMI 29.48 kg/m²     Diet: Diet NPO Exceptions are: Sips of Water with Meds  ADULT TUBE FEEDING; Nasogastric; Peptide Based; Continuous; 10; Yes; 10; Q 4 hours; 55; 60; Q 4 hours  DVT Prophylaxis: [x]PPx LMWH  []SQ Heparin  []IPC/SCDs  []Eliquis  []Xarelto  []Coumadin  [] Heparin Drip  []Other -      Code status: Limited  PT/OT Eval Status:   [x]NOT yet ordered  []Ordered and Pending   []Seen with Recommendations for:  []Home independently  []Home w/ assist  []HHC  []SNF  []Acute Rehab    Anticipated Discharge Day/Date:  Prognosis guarded. Palliative Care consulted. Changed to DNR. Anticipate family may proceed with transition to comfort care soon.     Anticipated Discharge Location: []Home  []HHC  []SNF  []Acute Rehab  []ECF  []LTAC  []Hospice  []Other -      Consults:      IP CONSULT TO DIETITIAN  IP CONSULT TO PULMONOLOGY  IP CONSULT TO DIETITIAN  PHARMACY TO DOSE VANCOMYCIN  IP CONSULT TO DIETITIAN      This patient has a high likelihood of being discharged tomorrow and is appropriate for A1 Discharge Unit in AM pending clinical course overnight: []Yes  [x]No    ------------------------------------------------------------------------------------------------------------------------------------------------------------------------    MDM    [x] High (any 2)    A. Problems (any 1)  [x] Acute/Chronic Illness/injury posing threat to life or bodily function:    [] Severe exacerbation of chronic illness:    ---------------------------------------------------------------------  B. Risk of Treatment (any 1)   [x]

## 2024-06-17 NOTE — ADT AUTH CERT
Consult Notes    Consults by Michael Velasco MD at 6/16/2024  9:31 AM    Author: Michael Velasco MD Specialty: Pulmonology, Critical Care Medicine Author Type: Physician   Filed: 6/16/2024 11:24 AM Date of Service: 6/16/2024  9:31 AM Status: Signed   : Michael Velasco MD (Physician)   Consult Orders   1. Consult to Pulmonology [[id number]] ordered by Kady Flanagan MD at 06/16/24 0905     Expand All Collapse All  INPATIENT PULMONARY CRITICAL CARE CONSULT NOTE        Chief Complaint/Referring Provider:  Patient is being seen at the request of Dr. Flanagan for a consultation for acute respiratory failure on ventilator     Presenting HPI: Patient was brought to the hospital status post cardiopulmonary arrest     As per the admitting provider-This is a 63 y.o. WF with PMHx sig for severe COPD who presents with cardiac arrest due to severe COPD after shampooing dog carpets at home.  She has been having a lot of pain with giving herself injections at home over the past few days.  She was sitting on the toilet yesterday, then called for her significant other with severe sx of breathing and then she passed out on the toilet.  While awaiting EMS, she lost her pulse.  They did obtain ROSC after about 20 minutes of CPR.  Head CT neg, Chest CT shows pneumomediastinum and fractured sternum consistent with CPR as well as aspiration pneumonia.  Pt intially had HTN emergency with BP of 230/130s now down to 115/60s.  Changed to fentanyl and versed drips.  Pt started on cefepime and vanco for aspiration pneumonia.      Patient when seen in the ICU is critically ill on mechanical vent support, patient was on 70% oxygen and PEEP of 5 to maintain oxygen saturation, patient was on IV sedation to maintain patient mental synchrony, patient is afebrile, patient blood pressure was slightly on the higher side, patient glycemic control was acceptable, patient has a sinus tachycardia on the monitor, no other acute medical  the CPR  Patient does not have any pneumothorax  Bronchodilators  Patient taking medication for chronic pain adding to the complexity of the disease process  Monitor input output and BMP  Correct electrolytes on whenever necessary basis  Enteral feeds as per metabolic support  Monitor for any hypoglycemia  PUD and DVT prophylaxis     Case discussed with ICU team and family     Patient remains critically ill and needs to monitor closely in the ICU     Critical care time spent with the patient was 40 minutes exclusive of any procedures                 Electronically signed by:  RESHMA WADE MD    6/16/2024    11:16 AM.

## 2024-06-17 NOTE — PROGRESS NOTES
PULM/CCM    Patient's MRI shows-IMPRESSION:  Abnormal signal involving the basal ganglia bilaterally, the thalami  bilaterally, the frontal lobes, parietal lobes, and occipital lobes  bilaterally as well as the cerebellum consistent with findings of an acute  anoxic ischemic injury.     Long-term prognosis appears to be guarded  Will request palliative care consult to establish the goals of care    Michael Velasco MD

## 2024-06-17 NOTE — PLAN OF CARE
Problem: Discharge Planning  Goal: Discharge to home or other facility with appropriate resources  Outcome: Progressing     Problem: Safety - Adult  Goal: Free from fall injury  Outcome: Progressing     Problem: Pain  Goal: Verbalizes/displays adequate comfort level or baseline comfort level  Outcome: Progressing     Problem: Skin/Tissue Integrity  Goal: Absence of new skin breakdown  Description: 1.  Monitor for areas of redness and/or skin breakdown  2.  Assess vascular access sites hourly  3.  Every 4-6 hours minimum:  Change oxygen saturation probe site  4.  Every 4-6 hours:  If on nasal continuous positive airway pressure, respiratory therapy assess nares and determine need for appliance change or resting period.  Outcome: Progressing     Problem: Nutrition Deficit:  Goal: Optimize nutritional status  Outcome: Progressing

## 2024-06-17 NOTE — PROGRESS NOTES
Shift: [id number]     Admitting diagnosis: cardiac arrest    Presentation to hospital: EMS called for SOB (suspected COPD exacerbation) Went unresponsive, estimated 10 min down time prior to arrival. Was asystole upon EMS arrival, epi x1, CPR x20 minutes then obtained ROSC.     Surgery: no     Nursing assessment at handoff  stable    Emergency Contact/POA:SonRell  Family updated: yes     Most recent vitals: /73   Pulse 82   Temp 98.9 °F (37.2 °C) (Bladder)   Resp 18   Ht 1.6 m (5' 3\")   Wt 75.5 kg (166 lb 7.2 oz)   LMP 08/25/2011   SpO2 98%   BMI 29.48 kg/m²      Rhythm: Normal Sinus Rhythm 70-90's     NC/HFNC- 0 lpm  Respiratory support: - Ventilator Settings:    Vt (Set, mL): 460 mL  Resp Rate (Set): 18 bpm  FiO2 : 30 %    PEEP/CPAP (cmH2O): 5       Vent days: Day 1    Increased O2 requirements: no    Admission weight Weight - Scale: 83.9 kg (185 lb)  Today's weight   Wt Readings from Last 1 Encounters:   06/17/24 75.5 kg (166 lb 7.2 oz)         UOP >30ml/hr: no 30 ml/hr on average    De need assessed each shift: yes, critically ill    Restraints: no  Order current and documentation up to date?    Lines/Drains  LDA Insertion Date Discontinued Date Dressing Changes   PIV  6/16 x3     TLC       Arterial       De  6/16     Vas Cath      ETT  6/16     Surgical drains        Night Shift Hospitalist Interventions    Problem(Brief) Date Time Intervention Physician contacted                                               Drip rates at handoff:    sodium chloride 5 mL/hr at 06/17/24 0232    sodium chloride 75 mL/hr at 06/17/24 0608    fentaNYL 75 mcg/hr (06/17/24 0608)    propofol 30 mcg/kg/min (06/17/24 0618)    dextrose         Hospital Course Daily Updates:    Admit Day# 0 Nights 6/16/24  -OPO notified  -200ml NG output  -360ml UOP  -intermittent eye opening to pain  -decorticating of arms noted with coughing     Admit Day#1 Nights 06/17/24  -200 out of NG  -Urine Output: 800  -Patient no longer has

## 2024-06-17 NOTE — PROGRESS NOTES
06/17/24 1217   Patient Observation   Pulse (!) 105   Respirations 21   SpO2 98 %   Vent Information   Vent Mode AC/PRVC   Ventilator Settings   FiO2  30 %   Insp Time (sec) 1.05 sec   Resp Rate (Set) 18 bpm   PEEP/CPAP (cmH2O) 5   Vt (Set, mL) 460 mL   Vent Patient Data (Readings)   Vt (Measured) 594 mL   Peak Inspiratory Pressure (cmH2O) 46 cmH2O   Rate Measured 19 br/min   Minute Volume (L/min) 11.7 Liters   Mean Airway Pressure (cmH2O) 17 cmH20   Plateau Pressure (cm H2O) 19 cm H2O   Driving Pressure 14   I:E Ratio 1:3.40   I Time/ I Time % 1 s   Vent Alarm Settings   High Pressure (cmH2O) 50 cmH2O   Low Minute Volume (lpm) 2 L/min   High Minute Volume (lpm) 20 L/min   Low Exhaled Vt (ml) 200 mL   High Exhaled Vt (ml) 1100 mL   RR High (bpm) 40 br/min   Additional Respiratoray Assessments   Humidification Source HME   Circuit Condensation Drained   Ambu Bag With Mask At Bedside Yes   Airway Clearance   Suction ET Tube   Suction Device Inline suction catheter   Sputum Method Obtained Endotracheal   Sputum Amount Small   Sputum Color/Odor White;Pink tinged   Sputum Consistency Thin   Non-Surgical Airway 06/16/24 Endotracheal tube   Placement Date/Time: 06/16/24 0447   Placed By: (c) Other (comment)  Location: Oral  Airway Device: Endotracheal tube  Size: 7   Secured At 22 cm   Measured From Lips   Secured Location Center   Secured By Commercial tube song   Site Assessment Dry   Cuff Pressure 30 cm H2O

## 2024-06-17 NOTE — PLAN OF CARE
Problem: Safety - Medical Restraint  Goal: Remains free of injury from restraints (Restraint for Interference with Medical Device)  Description: INTERVENTIONS:  1. Determine that other, less restrictive measures have been tried or would not be effective before applying the restraint  2. Evaluate the patient's condition at the time of restraint application  3. Inform patient/family regarding the reason for restraint  4. Q2H: Monitor safety, psychosocial status, comfort, nutrition and hydration  Outcome: Progressing     Problem: Discharge Planning  Goal: Discharge to home or other facility with appropriate resources  Outcome: Progressing  Flowsheets (Taken 6/16/2024 1000 by Diego Gaitan, RN)  Discharge to home or other facility with appropriate resources:   Identify barriers to discharge with patient and caregiver   Arrange for needed discharge resources and transportation as appropriate   Identify discharge learning needs (meds, wound care, etc)   Arrange for interpreters to assist at discharge as needed   Refer to discharge planning if patient needs post-hospital services based on physician order or complex needs related to functional status, cognitive ability or social support system     Problem: Safety - Adult  Goal: Free from fall injury  Outcome: Progressing     Problem: Pain  Goal: Verbalizes/displays adequate comfort level or baseline comfort level  Outcome: Progressing     Problem: Skin/Tissue Integrity  Goal: Absence of new skin breakdown  Description: 1.  Monitor for areas of redness and/or skin breakdown  2.  Assess vascular access sites hourly  3.  Every 4-6 hours minimum:  Change oxygen saturation probe site  4.  Every 4-6 hours:  If on nasal continuous positive airway pressure, respiratory therapy assess nares and determine need for appliance change or resting period.  Outcome: Progressing

## 2024-06-18 LAB
ANION GAP SERPL CALCULATED.3IONS-SCNC: 12 MMOL/L (ref 3–16)
BACTERIA SPEC RESP CULT: NORMAL
BASE EXCESS BLDA CALC-SCNC: -2.1 MMOL/L (ref -3–3)
BASOPHILS # BLD: 0 K/UL (ref 0–0.2)
BASOPHILS NFR BLD: 0.1 %
BUN SERPL-MCNC: 29 MG/DL (ref 7–20)
CALCIUM SERPL-MCNC: 8.7 MG/DL (ref 8.3–10.6)
CHLORIDE SERPL-SCNC: 109 MMOL/L (ref 99–110)
CO2 BLDA-SCNC: 22 MMOL/L
CO2 SERPL-SCNC: 20 MMOL/L (ref 21–32)
COHGB MFR BLDA: 0.4 % (ref 0–1.5)
CREAT SERPL-MCNC: <0.5 MG/DL (ref 0.6–1.2)
DEPRECATED RDW RBC AUTO: 14.1 % (ref 12.4–15.4)
EOSINOPHIL # BLD: 0 K/UL (ref 0–0.6)
EOSINOPHIL NFR BLD: 0 %
GFR SERPLBLD CREATININE-BSD FMLA CKD-EPI: >90 ML/MIN/{1.73_M2}
GLUCOSE BLD-MCNC: 172 MG/DL (ref 70–99)
GLUCOSE BLD-MCNC: 197 MG/DL (ref 70–99)
GLUCOSE SERPL-MCNC: 196 MG/DL (ref 70–99)
GRAM STN SPEC: NORMAL
HCO3 BLDA-SCNC: 21 MMOL/L (ref 21–29)
HCT VFR BLD AUTO: 40.9 % (ref 36–48)
HGB BLD-MCNC: 13.1 G/DL (ref 12–16)
HGB BLDA-MCNC: 13.6 G/DL (ref 12–16)
LYMPHOCYTES # BLD: 0.7 K/UL (ref 1–5.1)
LYMPHOCYTES NFR BLD: 4.4 %
MAGNESIUM SERPL-MCNC: 2.3 MG/DL (ref 1.8–2.4)
MCH RBC QN AUTO: 31.1 PG (ref 26–34)
MCHC RBC AUTO-ENTMCNC: 32.1 G/DL (ref 31–36)
MCV RBC AUTO: 96.8 FL (ref 80–100)
METHGB MFR BLDA: 0.3 %
MONOCYTES # BLD: 0.8 K/UL (ref 0–1.3)
MONOCYTES NFR BLD: 5.3 %
MRSA DNA SPEC QL NAA+PROBE: NORMAL
NEUTROPHILS # BLD: 13.9 K/UL (ref 1.7–7.7)
NEUTROPHILS NFR BLD: 90.2 %
O2 THERAPY: ABNORMAL
PCO2 BLDA: 31.5 MMHG (ref 35–45)
PERFORMED ON: ABNORMAL
PERFORMED ON: ABNORMAL
PH BLDA: 7.44 [PH] (ref 7.35–7.45)
PHOSPHATE SERPL-MCNC: 2.1 MG/DL (ref 2.5–4.9)
PLATELET # BLD AUTO: 263 K/UL (ref 135–450)
PMV BLD AUTO: 8.7 FL (ref 5–10.5)
PO2 BLDA: 74.9 MMHG (ref 75–108)
POTASSIUM SERPL-SCNC: 3.8 MMOL/L (ref 3.5–5.1)
RBC # BLD AUTO: 4.23 M/UL (ref 4–5.2)
SAO2 % BLDA: 95.6 %
SODIUM SERPL-SCNC: 141 MMOL/L (ref 136–145)
WBC # BLD AUTO: 15.5 K/UL (ref 4–11)

## 2024-06-18 PROCEDURE — 2580000003 HC RX 258: Performed by: INTERNAL MEDICINE

## 2024-06-18 PROCEDURE — 2580000003 HC RX 258: Performed by: STUDENT IN AN ORGANIZED HEALTH CARE EDUCATION/TRAINING PROGRAM

## 2024-06-18 PROCEDURE — 6360000002 HC RX W HCPCS: Performed by: STUDENT IN AN ORGANIZED HEALTH CARE EDUCATION/TRAINING PROGRAM

## 2024-06-18 PROCEDURE — 6370000000 HC RX 637 (ALT 250 FOR IP): Performed by: INTERNAL MEDICINE

## 2024-06-18 PROCEDURE — 6370000000 HC RX 637 (ALT 250 FOR IP): Performed by: NURSE PRACTITIONER

## 2024-06-18 PROCEDURE — 6360000002 HC RX W HCPCS: Performed by: INTERNAL MEDICINE

## 2024-06-18 PROCEDURE — 36415 COLL VENOUS BLD VENIPUNCTURE: CPT

## 2024-06-18 PROCEDURE — 6370000000 HC RX 637 (ALT 250 FOR IP): Performed by: STUDENT IN AN ORGANIZED HEALTH CARE EDUCATION/TRAINING PROGRAM

## 2024-06-18 PROCEDURE — 99291 CRITICAL CARE FIRST HOUR: CPT | Performed by: STUDENT IN AN ORGANIZED HEALTH CARE EDUCATION/TRAINING PROGRAM

## 2024-06-18 PROCEDURE — 2700000000 HC OXYGEN THERAPY PER DAY

## 2024-06-18 PROCEDURE — 85025 COMPLETE CBC W/AUTO DIFF WBC: CPT

## 2024-06-18 PROCEDURE — 94003 VENT MGMT INPAT SUBQ DAY: CPT

## 2024-06-18 PROCEDURE — 80048 BASIC METABOLIC PNL TOTAL CA: CPT

## 2024-06-18 PROCEDURE — 2000000000 HC ICU R&B

## 2024-06-18 PROCEDURE — 83735 ASSAY OF MAGNESIUM: CPT

## 2024-06-18 PROCEDURE — 94640 AIRWAY INHALATION TREATMENT: CPT

## 2024-06-18 PROCEDURE — 94761 N-INVAS EAR/PLS OXIMETRY MLT: CPT

## 2024-06-18 PROCEDURE — APPNB45 APP NON BILLABLE 31-45 MINUTES: Performed by: NURSE PRACTITIONER

## 2024-06-18 PROCEDURE — 6360000002 HC RX W HCPCS: Performed by: NURSE PRACTITIONER

## 2024-06-18 PROCEDURE — 2580000003 HC RX 258: Performed by: NURSE PRACTITIONER

## 2024-06-18 PROCEDURE — 84100 ASSAY OF PHOSPHORUS: CPT

## 2024-06-18 PROCEDURE — 2500000003 HC RX 250 WO HCPCS: Performed by: STUDENT IN AN ORGANIZED HEALTH CARE EDUCATION/TRAINING PROGRAM

## 2024-06-18 PROCEDURE — 82803 BLOOD GASES ANY COMBINATION: CPT

## 2024-06-18 RX ORDER — LORAZEPAM 2 MG/ML
2 INJECTION INTRAMUSCULAR
Status: DISCONTINUED | OUTPATIENT
Start: 2024-06-18 | End: 2024-06-20 | Stop reason: HOSPADM

## 2024-06-18 RX ORDER — ACETAMINOPHEN 160 MG/5ML
650 LIQUID ORAL EVERY 4 HOURS PRN
Status: DISCONTINUED | OUTPATIENT
Start: 2024-06-18 | End: 2024-06-18

## 2024-06-18 RX ORDER — ACETAMINOPHEN 160 MG/5ML
650 LIQUID ORAL EVERY 6 HOURS PRN
Status: DISCONTINUED | OUTPATIENT
Start: 2024-06-18 | End: 2024-06-20 | Stop reason: HOSPADM

## 2024-06-18 RX ORDER — MORPHINE SULFATE 2 MG/ML
2 INJECTION, SOLUTION INTRAMUSCULAR; INTRAVENOUS EVERY 5 MIN PRN
Status: DISCONTINUED | OUTPATIENT
Start: 2024-06-18 | End: 2024-06-20 | Stop reason: HOSPADM

## 2024-06-18 RX ORDER — MORPHINE SULFATE 2 MG/ML
2 INJECTION, SOLUTION INTRAMUSCULAR; INTRAVENOUS
Status: DISCONTINUED | OUTPATIENT
Start: 2024-06-18 | End: 2024-06-18

## 2024-06-18 RX ORDER — SCOLOPAMINE TRANSDERMAL SYSTEM 1 MG/1
1 PATCH, EXTENDED RELEASE TRANSDERMAL
Status: DISCONTINUED | OUTPATIENT
Start: 2024-06-18 | End: 2024-06-20 | Stop reason: HOSPADM

## 2024-06-18 RX ORDER — FENTANYL CITRATE 50 UG/ML
25 INJECTION, SOLUTION INTRAMUSCULAR; INTRAVENOUS EVERY 30 MIN PRN
Status: DISCONTINUED | OUTPATIENT
Start: 2024-06-18 | End: 2024-06-18

## 2024-06-18 RX ORDER — ATROPINE SULFATE 10 MG/ML
1 SOLUTION/ DROPS OPHTHALMIC 3 TIMES DAILY PRN
Status: CANCELLED | OUTPATIENT
Start: 2024-06-18

## 2024-06-18 RX ORDER — FENTANYL CITRATE-0.9 % NACL/PF 10 MCG/ML
50 PLASTIC BAG, INJECTION (ML) INTRAVENOUS CONTINUOUS
Status: DISCONTINUED | OUTPATIENT
Start: 2024-06-18 | End: 2024-06-18

## 2024-06-18 RX ORDER — FAMOTIDINE 20 MG/1
20 TABLET, FILM COATED ORAL 2 TIMES DAILY
Status: DISCONTINUED | OUTPATIENT
Start: 2024-06-18 | End: 2024-06-18

## 2024-06-18 RX ORDER — LORAZEPAM 2 MG/ML
0.5 INJECTION INTRAMUSCULAR
Status: DISCONTINUED | OUTPATIENT
Start: 2024-06-18 | End: 2024-06-18

## 2024-06-18 RX ORDER — LORAZEPAM 2 MG/ML
1 INJECTION INTRAMUSCULAR
Status: DISCONTINUED | OUTPATIENT
Start: 2024-06-18 | End: 2024-06-20 | Stop reason: HOSPADM

## 2024-06-18 RX ORDER — AMLODIPINE BESYLATE 5 MG/1
10 TABLET ORAL DAILY
Status: DISCONTINUED | OUTPATIENT
Start: 2024-06-18 | End: 2024-06-18

## 2024-06-18 RX ORDER — ATROPINE SULFATE 10 MG/ML
2 SOLUTION/ DROPS OPHTHALMIC EVERY 4 HOURS PRN
Status: DISCONTINUED | OUTPATIENT
Start: 2024-06-18 | End: 2024-06-20 | Stop reason: HOSPADM

## 2024-06-18 RX ORDER — MORPHINE SULFATE 4 MG/ML
4 INJECTION, SOLUTION INTRAMUSCULAR; INTRAVENOUS
Status: DISCONTINUED | OUTPATIENT
Start: 2024-06-18 | End: 2024-06-18

## 2024-06-18 RX ORDER — LORAZEPAM 2 MG/ML
1 INJECTION INTRAMUSCULAR
Status: DISCONTINUED | OUTPATIENT
Start: 2024-06-18 | End: 2024-06-18

## 2024-06-18 RX ORDER — PREDNISONE 20 MG/1
40 TABLET ORAL DAILY
Status: DISCONTINUED | OUTPATIENT
Start: 2024-06-19 | End: 2024-06-18

## 2024-06-18 RX ORDER — MORPHINE SULFATE 4 MG/ML
4 INJECTION, SOLUTION INTRAMUSCULAR; INTRAVENOUS EVERY 30 MIN PRN
Status: DISCONTINUED | OUTPATIENT
Start: 2024-06-18 | End: 2024-06-20 | Stop reason: HOSPADM

## 2024-06-18 RX ADMIN — AMLODIPINE BESYLATE 10 MG: 5 TABLET ORAL at 09:32

## 2024-06-18 RX ADMIN — SODIUM CHLORIDE 15 MG/HR: 9 INJECTION, SOLUTION INTRAVENOUS at 05:13

## 2024-06-18 RX ADMIN — MORPHINE SULFATE 4 MG: 4 INJECTION, SOLUTION INTRAMUSCULAR; INTRAVENOUS at 20:46

## 2024-06-18 RX ADMIN — WHITE PETROLATUM 57.7 %-MINERAL OIL 31.9 % EYE OINTMENT: at 03:39

## 2024-06-18 RX ADMIN — FAMOTIDINE 20 MG: 20 TABLET, FILM COATED ORAL at 08:12

## 2024-06-18 RX ADMIN — Medication 1 CAPSULE: at 08:12

## 2024-06-18 RX ADMIN — PREDNISONE 40 MG: 20 TABLET ORAL at 08:12

## 2024-06-18 RX ADMIN — SODIUM CHLORIDE, PRESERVATIVE FREE 10 ML: 5 INJECTION INTRAVENOUS at 08:14

## 2024-06-18 RX ADMIN — MORPHINE SULFATE 4 MG: 4 INJECTION, SOLUTION INTRAMUSCULAR; INTRAVENOUS at 20:18

## 2024-06-18 RX ADMIN — LORAZEPAM 1 MG: 2 INJECTION INTRAMUSCULAR; INTRAVENOUS at 13:00

## 2024-06-18 RX ADMIN — MORPHINE SULFATE 4 MG: 4 INJECTION, SOLUTION INTRAMUSCULAR; INTRAVENOUS at 23:23

## 2024-06-18 RX ADMIN — CEFEPIME 2000 MG: 2 INJECTION, POWDER, FOR SOLUTION INTRAVENOUS at 00:18

## 2024-06-18 RX ADMIN — LORAZEPAM 2 MG: 2 INJECTION INTRAMUSCULAR; INTRAVENOUS at 17:17

## 2024-06-18 RX ADMIN — SODIUM CHLORIDE 15 MG/HR: 9 INJECTION, SOLUTION INTRAVENOUS at 08:37

## 2024-06-18 RX ADMIN — MORPHINE SULFATE 2 MG: 2 INJECTION, SOLUTION INTRAMUSCULAR; INTRAVENOUS at 15:50

## 2024-06-18 RX ADMIN — LORAZEPAM 2 MG: 2 INJECTION INTRAMUSCULAR; INTRAVENOUS at 17:53

## 2024-06-18 RX ADMIN — CHLORHEXIDINE GLUCONATE, 0.12% ORAL RINSE 15 ML: 1.2 SOLUTION DENTAL at 08:12

## 2024-06-18 RX ADMIN — ENOXAPARIN SODIUM 40 MG: 100 INJECTION SUBCUTANEOUS at 08:12

## 2024-06-18 RX ADMIN — MORPHINE SULFATE 4 MG: 4 INJECTION, SOLUTION INTRAMUSCULAR; INTRAVENOUS at 22:21

## 2024-06-18 RX ADMIN — FENTANYL CITRATE 25 MCG: 50 INJECTION INTRAMUSCULAR; INTRAVENOUS at 05:40

## 2024-06-18 RX ADMIN — LORAZEPAM 2 MG: 2 INJECTION INTRAMUSCULAR; INTRAVENOUS at 20:18

## 2024-06-18 RX ADMIN — ATROPINE SULFATE 2 DROP: 10 SOLUTION/ DROPS OPHTHALMIC at 21:48

## 2024-06-18 RX ADMIN — MORPHINE SULFATE 2 MG: 2 INJECTION, SOLUTION INTRAMUSCULAR; INTRAVENOUS at 16:33

## 2024-06-18 RX ADMIN — IPRATROPIUM BROMIDE AND ALBUTEROL SULFATE 1 DOSE: 2.5; .5 SOLUTION RESPIRATORY (INHALATION) at 07:11

## 2024-06-18 RX ADMIN — LORAZEPAM 2 MG: 2 INJECTION INTRAMUSCULAR; INTRAVENOUS at 22:21

## 2024-06-18 RX ADMIN — CEFEPIME 2000 MG: 2 INJECTION, POWDER, FOR SOLUTION INTRAVENOUS at 08:20

## 2024-06-18 RX ADMIN — MORPHINE SULFATE 2 MG: 2 INJECTION, SOLUTION INTRAMUSCULAR; INTRAVENOUS at 12:59

## 2024-06-18 RX ADMIN — MORPHINE SULFATE 4 MG: 4 INJECTION, SOLUTION INTRAMUSCULAR; INTRAVENOUS at 17:53

## 2024-06-18 RX ADMIN — SODIUM CHLORIDE 15 MG/HR: 9 INJECTION, SOLUTION INTRAVENOUS at 01:04

## 2024-06-18 RX ADMIN — LORAZEPAM 1 MG: 2 INJECTION INTRAMUSCULAR; INTRAVENOUS at 15:11

## 2024-06-18 RX ADMIN — MORPHINE SULFATE 2 MG: 2 INJECTION, SOLUTION INTRAMUSCULAR; INTRAVENOUS at 13:04

## 2024-06-18 RX ADMIN — MORPHINE SULFATE 2 MG: 2 INJECTION, SOLUTION INTRAMUSCULAR; INTRAVENOUS at 16:56

## 2024-06-18 RX ADMIN — MUPIROCIN: 20 OINTMENT TOPICAL at 08:13

## 2024-06-18 RX ADMIN — MORPHINE SULFATE 2 MG: 2 INJECTION, SOLUTION INTRAMUSCULAR; INTRAVENOUS at 14:54

## 2024-06-18 RX ADMIN — LORAZEPAM 2 MG: 2 INJECTION INTRAMUSCULAR; INTRAVENOUS at 23:23

## 2024-06-18 RX ADMIN — MORPHINE SULFATE 4 MG: 4 INJECTION, SOLUTION INTRAMUSCULAR; INTRAVENOUS at 21:48

## 2024-06-18 RX ADMIN — ACETAMINOPHEN 650 MG: 650 SOLUTION ORAL at 00:09

## 2024-06-18 RX ADMIN — MORPHINE SULFATE 4 MG: 4 INJECTION, SOLUTION INTRAMUSCULAR; INTRAVENOUS at 21:15

## 2024-06-18 RX ADMIN — POTASSIUM PHOSPHATE, MONOBASIC POTASSIUM PHOSPHATE, DIBASIC 15 MMOL: 224; 236 INJECTION, SOLUTION, CONCENTRATE INTRAVENOUS at 09:42

## 2024-06-18 RX ADMIN — WHITE PETROLATUM 57.7 %-MINERAL OIL 31.9 % EYE OINTMENT: at 08:13

## 2024-06-18 RX ADMIN — LORAZEPAM 2 MG: 2 INJECTION INTRAMUSCULAR; INTRAVENOUS at 19:45

## 2024-06-18 RX ADMIN — LORAZEPAM 1 MG: 2 INJECTION INTRAMUSCULAR; INTRAVENOUS at 13:29

## 2024-06-18 RX ADMIN — LORAZEPAM 2 MG: 2 INJECTION INTRAMUSCULAR; INTRAVENOUS at 20:46

## 2024-06-18 RX ADMIN — SODIUM CHLORIDE 15 MG/HR: 9 INJECTION, SOLUTION INTRAVENOUS at 03:06

## 2024-06-18 RX ADMIN — LORAZEPAM 1 MG: 2 INJECTION INTRAMUSCULAR; INTRAVENOUS at 16:31

## 2024-06-18 RX ADMIN — LORAZEPAM 2 MG: 2 INJECTION INTRAMUSCULAR; INTRAVENOUS at 21:15

## 2024-06-18 RX ADMIN — MORPHINE SULFATE 4 MG: 4 INJECTION, SOLUTION INTRAMUSCULAR; INTRAVENOUS at 22:53

## 2024-06-18 RX ADMIN — MORPHINE SULFATE 4 MG: 4 INJECTION, SOLUTION INTRAMUSCULAR; INTRAVENOUS at 17:16

## 2024-06-18 RX ADMIN — MORPHINE SULFATE 2 MG: 2 INJECTION, SOLUTION INTRAMUSCULAR; INTRAVENOUS at 08:59

## 2024-06-18 RX ADMIN — LORAZEPAM 2 MG: 2 INJECTION INTRAMUSCULAR; INTRAVENOUS at 22:53

## 2024-06-18 RX ADMIN — LORAZEPAM 2 MG: 2 INJECTION INTRAMUSCULAR; INTRAVENOUS at 21:48

## 2024-06-18 RX ADMIN — FENTANYL CITRATE 25 MCG: 50 INJECTION INTRAMUSCULAR; INTRAVENOUS at 03:13

## 2024-06-18 RX ADMIN — LORAZEPAM 1 MG: 2 INJECTION INTRAMUSCULAR; INTRAVENOUS at 16:57

## 2024-06-18 RX ADMIN — MORPHINE SULFATE 4 MG: 4 INJECTION, SOLUTION INTRAMUSCULAR; INTRAVENOUS at 19:46

## 2024-06-18 ASSESSMENT — PULMONARY FUNCTION TESTS
PIF_VALUE: 16
PIF_VALUE: 22
PIF_VALUE: 21
PIF_VALUE: 21
PIF_VALUE: 50
PIF_VALUE: 21
PIF_VALUE: 22
PIF_VALUE: 23
PIF_VALUE: 16
PIF_VALUE: 23
PIF_VALUE: 22
PIF_VALUE: 22
PIF_VALUE: 16
PIF_VALUE: 22
PIF_VALUE: 16
PIF_VALUE: 17
PIF_VALUE: 21
PIF_VALUE: 24
PIF_VALUE: 22
PIF_VALUE: 24
PIF_VALUE: 22
PIF_VALUE: 23
PIF_VALUE: 16
PIF_VALUE: 22
PIF_VALUE: 16
PIF_VALUE: 17
PIF_VALUE: 16
PIF_VALUE: 25
PIF_VALUE: 21
PIF_VALUE: 18
PIF_VALUE: 16
PIF_VALUE: 21
PIF_VALUE: 23
PIF_VALUE: 23
PIF_VALUE: 20
PIF_VALUE: 22
PIF_VALUE: 16
PIF_VALUE: 16
PIF_VALUE: 23
PIF_VALUE: 16
PIF_VALUE: 17
PIF_VALUE: 18
PIF_VALUE: 47
PIF_VALUE: 24
PIF_VALUE: 16
PIF_VALUE: 16
PIF_VALUE: 21

## 2024-06-18 ASSESSMENT — PAIN SCALES - GENERAL: PAINLEVEL_OUTOF10: 0

## 2024-06-18 NOTE — PROGRESS NOTES
Hospital Medicine Progress Note      Date of Admission: 6/16/2024  Hospital Day: 3    Chief Admission Complaint:  Cardiac arrest      Subjective:  Remains on vent support. Unresponsive. Discussed with family. Plan for terminal extubation and comfort care.     Telemetry (reviewed by me):      Presenting Admission History:       63 y.o. WF with PMHx sig for severe COPD who presents with cardiac arrest due to severe COPD after shampooing dog carpets at home.  She has been having a lot of pain with giving herself injections at home over the past few days.  She was sitting on the toilet yesterday, then called for her significant other with severe sx of breathing and then she passed out on the toilet.  While awaiting EMS, she lost her pulse.  They did obtain ROSC after about 20 minutes of CPR.  Head CT neg, Chest CT shows pneumomediastinum and fractured sternum consistent with CPR as well as aspiration pneumonia.  Pt intially had HTN emergency with BP of 230/130s now down to 115/60s.  Changed to fentanyl and versed drips.  Pt started on cefepime and vanco for aspiration pneumonia.     Assessment/Plan:      Probable Anoxic Brain Injury - Brain MRDiscussed with family. Plan for terminal extubation and comfort care.     Cardiopulmonary arrest - likely due to severe COPD exacerbation. Concern for anoxic brain injury. Continue vent support, supportive care    Acute Respiratory Failure with Hypoxia, 2/2 COPD Exacerbation, likely aspiration pneumonia. Continue vent support.     Severe Sepsis 2/2 Aspiration pneumonia, POA - Continue empiric Abx    COPD Exacerbation - Continue bronchodilators, steroids, Abx.     Pneumomediastinum - likely due to CPR with fractured sternum. Monitor.     HTN emergency - Continue Cardene drip. Monitor.     Rheumatoid arthritis - on high dose steroids for now which should control sx    Physical Exam Performed:      General appearance: Intubated  Respiratory: On vent. Diminished.   Cardiovascular:  Regular rate and rhythm, normal S1/S2, no murmurs. No JVD.     Abdomen: Soft, non-distended, normal bowel sounds.   Neuro: Unresponsive   Psychiatric: Intubated, unresponsive     BP (!) 156/71   Pulse (!) 114   Temp 99.5 °F (37.5 °C) (Bladder)   Resp 23   Ht 1.6 m (5' 3\")   Wt 75.5 kg (166 lb 7.2 oz)   LMP 08/25/2011   SpO2 97%   BMI 29.48 kg/m²     Diet: Diet NPO Exceptions are: Sips of Water with Meds  ADULT TUBE FEEDING; Nasogastric; Peptide Based; Continuous; 10; Yes; 10; Q 4 hours; 55; 60; Q 4 hours  DVT Prophylaxis: [x]PPx LMWH  []SQ Heparin  []IPC/SCDs  []Eliquis  []Xarelto  []Coumadin  [] Heparin Drip  []Other -      Code status: DNR-CC  PT/OT Eval Status:   [x]NOT yet ordered  []Ordered and Pending   []Seen with Recommendations for:  []Home independently  []Home w/ assist  []HHC  []SNF  []Acute Rehab    Anticipated Discharge Day/Date:  Plan for terminal extubation and comfort care.     Anticipated Discharge Location: []Home  []HHC  []SNF  []Acute Rehab  []ECF  []LTAC  [x]Hospice  []Other -      Consults:      IP CONSULT TO DIETITIAN  IP CONSULT TO PULMONOLOGY  IP CONSULT TO DIETITIAN  PHARMACY TO DOSE VANCOMYCIN  IP CONSULT TO DIETITIAN  IP CONSULT TO PALLIATIVE CARE  IP CONSULT TO HOSPICE      This patient has a high likelihood of being discharged tomorrow and is appropriate for A1 Discharge Unit in AM pending clinical course overnight: []Yes  [x]No    ------------------------------------------------------------------------------------------------------------------------------------------------------------------------    MDM    [x] High (any 2)    A. Problems (any 1)  [x] Acute/Chronic Illness/injury posing threat to life or bodily function:    [] Severe exacerbation of chronic illness:    ---------------------------------------------------------------------  B. Risk of Treatment (any 1)   [x] Drugs/treatments that require intensive monitoring for toxicity include:    [x] IV ABX requiring serial

## 2024-06-18 NOTE — PROGRESS NOTES
06/18/24 1217   Patient Observation   Pulse (!) 115   Respirations 24   SpO2 97 %   Breath Sounds   Breath Sounds Bilateral Diminished   Vent Information   Vent Mode CPAP/PS   Ventilator Settings   FiO2  30 %   Insp Time (sec) 10 sec   PEEP/CPAP (cmH2O) 5   Vent Patient Data (Readings)   Vt (Measured) 391 mL   Peak Inspiratory Pressure (cmH2O) 16 cmH2O   Rate Measured 23 br/min   Minute Volume (L/min) 9.04 Liters   Mean Airway Pressure (cmH2O) 8.1 cmH20   Plateau Pressure (cm H2O) 19 cm H2O   Driving Pressure 14   I:E Ratio 1:2.40   Vent Alarm Settings   High Pressure (cmH2O) 50 cmH2O   Low Minute Volume (lpm) 2 L/min   RR High (bpm) 40 br/min

## 2024-06-18 NOTE — PROGRESS NOTES
Caterina Chavez admitted 6/16/2024  4:45 AM FOR Cardiopulmonary arrest with successful resuscitation (HCC)    Patient seen and examined today during multidisciplinary ICU rounds.  No major events overnight, fentanyl added for vent synchrony and coughing episodes    Past Medical History:   Diagnosis Date    asthma     Depression     better after divorce    Hypertension     Neuropathy     R hand      Past Surgical History:   Procedure Laterality Date    CERVICAL LAMINECTOMY      DIAGNOSTIC CARDIAC CATH LAB PROCEDURE          BP (!) 160/63   Pulse (!) 131   Temp 99.5 °F (37.5 °C) (Bladder)   Resp 27   Ht 1.6 m (5' 3\")   Wt 75.5 kg (166 lb 7.2 oz)   LMP 08/25/2011   SpO2 98%   BMI 29.48 kg/m²     Non-Surgical Airway 06/16/24 Endotracheal tube (Active)   Placement Date/Time: 06/16/24 0447   Placed By: (c) Other (comment)  Location: Oral  Airway Device: Endotracheal tube  Size: 7       Plan:    Reviewed MRI results and clinical status with family at the bedside. They have elected for transition to comfort care and terminal extubation today.   Vent adjusted to PS 10/5 and patient continues to initiate spontaneous breathing but no purposeful movements.   Family would be interested in hospice evaluation once extubated.  DNR-CC, comfort medications ordered.    Discussed with ICU team, updated palliative care on family discussion.      Nahomy Hernandez, APRN-CNP

## 2024-06-18 NOTE — PROGRESS NOTES
06/18/24 1315   Oxygen Therapy/Pulse Ox   Pulse (!) 120   Respirations 30   SpO2 (!) 87 %     Patient extubated to Room Air per MD order. RN at bedside.

## 2024-06-18 NOTE — PROGRESS NOTES
06/18/24 0716   Patient Observation   Pulse (!) 123   Respirations 23   SpO2 100 %   Breath Sounds   Right Upper Lobe End expiratory wheezes   Right Middle Lobe End expiratory wheezes   Right Lower Lobe End expiratory wheezes   Left Upper Lobe End expiratory wheezes   Left Lower Lobe End expiratory wheezes   Vent Information   Equipment Changed HME   Vent Mode AC/PRVC   Ventilator Settings   FiO2  30 %   Insp Time (sec) 0.95 sec   Resp Rate (Set) 18 bpm   PEEP/CPAP (cmH2O) 5   Vt (Set, mL) 460 mL   Vent Patient Data (Readings)   Vt (Measured) 474 mL   Peak Inspiratory Pressure (cmH2O) 20 cmH2O   Rate Measured 30 br/min   Minute Volume (L/min) 14 Liters   Mean Airway Pressure (cmH2O) 12 cmH20   Plateau Pressure (cm H2O) 19 cm H2O   Driving Pressure 14   I:E Ratio 1:2   I Time/ I Time % 0 s   Vent Alarm Settings   High Pressure (cmH2O) 50 cmH2O   Low Minute Volume (lpm) 2 L/min   Low Exhaled Vt (ml) 200 mL   RR High (bpm) 40 br/min   Apnea (secs) 20 secs   Additional Respiratoray Assessments   Humidification Source HME   Ambu Bag With Mask At Bedside Yes   Airway Clearance   Suction ET Tube   Sputum Amount Moderate   Sputum Color/Odor Bloody   Non-Surgical Airway 06/16/24 Endotracheal tube   Placement Date/Time: 06/16/24 0447   Placed By: (c) Other (comment)  Location: Oral  Airway Device: Endotracheal tube  Size: 7   Secured At 22 cm   Measured From Lips   Secured Location Left   Secured By Commercial tube song   Site Assessment Dry   Cuff Pressure 30 cm H2O

## 2024-06-18 NOTE — PROGRESS NOTES
06/17/24 1955   Patient Observation   Pulse (!) 128   Respirations 23   SpO2 98 %   Breath Sounds   Right Upper Lobe Diminished   Right Middle Lobe Diminished   Right Lower Lobe Diminished   Left Upper Lobe Diminished   Left Lower Lobe Diminished   Vent Information   Vent Mode AC/PRVC   Ventilator Settings   FiO2  30 %   Insp Time (sec) 1.05 sec   Resp Rate (Set) 18 bpm   PEEP/CPAP (cmH2O) 5   Vt (Set, mL) 460 mL   Vent Patient Data (Readings)   Vt (Measured) 461 mL   Peak Inspiratory Pressure (cmH2O) 20 cmH2O   Rate Measured 19 br/min   Minute Volume (L/min) 8.91 Liters   Mean Airway Pressure (cmH2O) 9.7 cmH20   Plateau Pressure (cm H2O) 19 cm H2O   Driving Pressure 14   I:E Ratio 1:2.00   I Time/ I Time % 1.05 s   Vent Alarm Settings   High Pressure (cmH2O) 50 cmH2O   Low Minute Volume (lpm) 2 L/min   Low Exhaled Vt (ml) 200 mL   RR High (bpm) 40 br/min   Additional Respiratoray Assessments   Humidification Source HME   Ambu Bag With Mask At Bedside Yes   Airway Clearance   Suction ET Tube   Suction Device Inline suction catheter   Sputum Method Obtained Endotracheal   Sputum Amount Small   Sputum Color/Odor Pink tinged;White   Sputum Consistency Thin   Non-Surgical Airway 06/16/24 Endotracheal tube   Placement Date/Time: 06/16/24 0447   Placed By: (c) Other (comment)  Location: Oral  Airway Device: Endotracheal tube  Size: 7   Secured At 22 cm   Measured From Lips   Secured Location Center   Secured By Commercial tube song   Site Assessment Dry   Cuff Pressure 28 cm H2O   Treatment   $Treatment Type $Inhaled Therapy/Meds

## 2024-06-18 NOTE — FLOWSHEET NOTE
Hospice called this RN.  Pt family is requesting to wait until tomorrow to talk about transferring her into hospice care

## 2024-06-18 NOTE — PROGRESS NOTES
Physician Progress Note      PATIENT:               ADAM MOONEY  CSN #:                  [id number]  :                       1960  ADMIT DATE:       2024 4:45 AM  DISCH DATE:  RESPONDING  PROVIDER #:        Nimesh Logan MD          QUERY TEXT:    Patient admitted with cardiac arrest. Documentation reflects \"Severe sepsis\"   in ED provider note .  If possible, please document in the progress notes   and discharge summary if severe sepsis was:    The medical record reflects the following:  Risk Factors: COPD, aspiration pneumonia, acute respiratory failure,   cardiopulmonary arrest    Clinical Indicators: Per ED provider -\"Time Severe Sepsis Identified:   0500, Fluid Resuscitation Rational: less than 30mL/kg because of c/f fluid   overload, hypertensive emergency in the post arrest state.\"  on arrival to ED  at 0450- Temp 96.1, -128, WBC 16.4/Bands 10%,   Lactic acid 8.3, acute respiratory failure  on - Procalcitonin 2.27  Per H/P - \"Cardiopulmonary arrest - likely due to severe COPD   exacerbation...Aspiration pneumonia\"  CT chest- Multifocal pneumonia    Treatment: Intubation/mechanical ventilation, imaging, blood culture- NGTD,   serial labs, 1L NS IVF bolus followed by 75 ml/hr. cont. infusion, IV   Cefepime/Vancomycin, Solumedrol, Pulmonary consult, Bronchoscopy, prednisone,   palliative care discussions    Thank you,  Emilia Drew RN BSN CRCR CCDS  [email or web address]  Options provided:  -- Severe sepsis due to aspiration pneumonia present on arrival confirmed  -- Severe sepsis ruled out after study  -- Other - I will add my own diagnosis  -- Disagree - Not applicable / Not valid  -- Disagree - Clinically unable to determine / Unknown  -- Refer to Clinical Documentation Reviewer    PROVIDER RESPONSE TEXT:    Severe sepsis due to aspiration pneumonia present on arrival confirmed    Query created by: Karly Drew on 2024 9:34  AM      Electronically signed by:  Nimesh Logan MD 6/18/2024 11:13 AM

## 2024-06-18 NOTE — PROGRESS NOTES
06/18/24 1148   Encounter Summary   Encounter Overview/Reason Grief, Loss, and Adjustments;Spiritual/Emotional Needs   Service Provided For Patient;Friend   Referral/Consult From Rounding   Last Encounter  06/18/24  ( visited and offered prayer; provided a prayer blanket; will continue to visit and offer support)   Begin Time 1135   End Time  1140   Total Time Calculated 5 min   Grief, Loss, and Adjustments   Type Anticipatory Grief   Assessment/Intervention/Outcome   Intervention Prayer (assurance of)/[place];Sustaining Presence/Ministry of presence

## 2024-06-18 NOTE — CARE COORDINATION
GINNA was notified by the RN now that the pt has a hospice consult. I spoke with the SO and his son on the phone and [facility] is their choice. GINNA left  on Jammcard's cell, then called the main line at [facility]. Faxed FS to [phone]

## 2024-06-18 NOTE — PROGRESS NOTES
06/17/24 2325   Patient Observation   Pulse (!) 119   Respirations 18   SpO2 97 %   Breath Sounds   Right Upper Lobe Rhonchi   Right Middle Lobe Rhonchi   Right Lower Lobe Rhonchi   Left Upper Lobe Rhonchi   Left Lower Lobe Rhonchi   Vent Information   Vent Mode AC/PRVC   Ventilator Settings   FiO2  30 %   Insp Time (sec) 1.05 sec   Resp Rate (Set) 18 bpm   PEEP/CPAP (cmH2O) 5   Vt (Set, mL) 460 mL   Vent Patient Data (Readings)   Vt (Measured) 463 mL   Peak Inspiratory Pressure (cmH2O) 21 cmH2O   Rate Measured 18 br/min   Minute Volume (L/min) 8.31 Liters   Mean Airway Pressure (cmH2O) 10 cmH20   Plateau Pressure (cm H2O) 19 cm H2O   Driving Pressure 14   I:E Ratio 1:2.20   I Time/ I Time % 1.05 s   Vent Alarm Settings   High Pressure (cmH2O) 50 cmH2O   Low Minute Volume (lpm) 2 L/min   Low Exhaled Vt (ml) 200 mL   RR High (bpm) 40 br/min   Additional Respiratoray Assessments   Humidification Source HME   Ambu Bag With Mask At Bedside Yes   Airway Clearance   Suction ET Tube   Suction Device Inline suction catheter   Sputum Method Obtained Endotracheal   Sputum Amount Large   Sputum Color/Odor Bloody;Tan   Sputum Consistency Thick   Non-Surgical Airway 06/16/24 Endotracheal tube   Placement Date/Time: 06/16/24 0447   Placed By: (c) Other (comment)  Location: Oral  Airway Device: Endotracheal tube  Size: 7   Secured At 22 cm   Measured From Lips   Secured Location Left   Secured By Commercial tube song   Site Assessment Dry   Cuff Pressure 28 cm H2O

## 2024-06-18 NOTE — PROGRESS NOTES
06/18/24 0327   Patient Observation   Pulse (!) 105   Respirations 19   SpO2 99 %   Breath Sounds   Right Upper Lobe Rhonchi   Right Middle Lobe Rhonchi   Right Lower Lobe Rhonchi   Left Upper Lobe Rhonchi   Left Lower Lobe Rhonchi   Vent Information   Equipment Changed HME   Vent Mode AC/PRVC   Ventilator Settings   FiO2  30 %   Insp Time (sec) 1.05 sec   Resp Rate (Set) 18 bpm   PEEP/CPAP (cmH2O) 5   Vt (Set, mL) 460 mL   Vent Patient Data (Readings)   Vt (Measured) 475 mL   Peak Inspiratory Pressure (cmH2O) 21 cmH2O   Rate Measured 19 br/min   Minute Volume (L/min) 9.01 Liters   Mean Airway Pressure (cmH2O) 10 cmH20   Plateau Pressure (cm H2O) 19 cm H2O   Driving Pressure 14   I:E Ratio 1:2.10   I Time/ I Time % 1.05 s   Vent Alarm Settings   High Pressure (cmH2O) 50 cmH2O   Low Minute Volume (lpm) 2 L/min   Low Exhaled Vt (ml) 200 mL   RR High (bpm) 40 br/min   Additional Respiratoray Assessments   Humidification Source HME   Ambu Bag With Mask At Bedside Yes   Airway Clearance   Suction ET Tube   Suction Device Inline suction catheter   Sputum Method Obtained Endotracheal   Sputum Amount Large   Sputum Color/Odor Bloody;Tan   Sputum Consistency Thick;Mucous plugs   Non-Surgical Airway 06/16/24 Endotracheal tube   Placement Date/Time: 06/16/24 0447   Placed By: (c) Other (comment)  Location: Oral  Airway Device: Endotracheal tube  Size: 7   Secured At 22 cm   Measured From Lips   Secured Location Left  (pt biting, unable to move ETT)   Secured By Commercial tube song   Site Assessment Dry   Cuff Pressure   (MOV)

## 2024-06-18 NOTE — PLAN OF CARE
Problem: Safety - Medical Restraint  Goal: Remains free of injury from restraints (Restraint for Interference with Medical Device)  Description: INTERVENTIONS:  1. Determine that other, less restrictive measures have been tried or would not be effective before applying the restraint  2. Evaluate the patient's condition at the time of restraint application  3. Inform patient/family regarding the reason for restraint  4. Q2H: Monitor safety, psychosocial status, comfort, nutrition and hydration  6/17/2024 2054 by Timmy Juarez RN  Outcome: Progressing     Problem: Discharge Planning  Goal: Discharge to home or other facility with appropriate resources  6/17/2024 2054 by Timmy Juarez RN  Outcome: Progressing     Problem: Safety - Adult  Goal: Free from fall injury  6/17/2024 2054 by Timmy Juarez RN  Outcome: Progressing     Problem: Pain  Goal: Verbalizes/displays adequate comfort level or baseline comfort level  6/17/2024 2054 by Timmy Juarez RN  Outcome: Progressing     Problem: Skin/Tissue Integrity  Goal: Absence of new skin breakdown  Description: 1.  Monitor for areas of redness and/or skin breakdown  2.  Assess vascular access sites hourly  3.  Every 4-6 hours minimum:  Change oxygen saturation probe site  4.  Every 4-6 hours:  If on nasal continuous positive airway pressure, respiratory therapy assess nares and determine need for appliance change or resting period.  6/17/2024 2054 by Timmy Juarez RN  Outcome: Progressing     Problem: Nutrition Deficit:  Goal: Optimize nutritional status  6/17/2024 2054 by Timmy Juarez RN  Outcome: Progressing

## 2024-06-18 NOTE — PROGRESS NOTES
Pulmonary & Critical Care Medicine ICU Progress Note      Events of Last 24 hours:   Pt given fentanyl pushes for cough overnight. She is intubated and sedated.       Invasive Lines: PICC D#None   CVC D#None  Art Line D#None            Vitals:  /62   Pulse 97   Temp 99.9 °F (37.7 °C) (Bladder)   Resp 18   Ht 1.6 m (5' 3\")   Wt 75.5 kg (166 lb 7.2 oz)   LMP 08/25/2011   SpO2 99%   BMI 29.48 kg/m²    Tmax:  CVP:        Intake/Output Summary (Last 24 hours) at 6/18/2024 0703  Last data filed at 6/18/2024 0600  Gross per 24 hour   Intake 2143.22 ml   Output 1450 ml   Net 693.22 ml       EXAM:  Physical Exam  Constitutional:       Appearance: She is ill-appearing and toxic-appearing.      Comments: Intubated, not responding with no sedation   HENT:      Head: Normocephalic and atraumatic.      Nose: Nose normal.      Mouth/Throat:      Pharynx: No oropharyngeal exudate.   Eyes:      General: No scleral icterus.        Right eye: No discharge.         Left eye: No discharge.   Cardiovascular:      Rate and Rhythm: Tachycardia present.      Heart sounds: No murmur heard.     No gallop.   Pulmonary:      Effort: Pulmonary effort is normal. No respiratory distress.      Breath sounds: No wheezing or rales.   Abdominal:      General: Abdomen is flat. Bowel sounds are normal. There is no distension.      Tenderness: There is no abdominal tenderness.   Musculoskeletal:         General: Swelling (1+ edema) present.      Cervical back: Normal range of motion.   Skin:     General: Skin is warm and dry.   Neurological:      Comments: No response on vent, not following commands          Medications:  Scheduled Meds:   cefepime  2,000 mg IntraVENous Q8H    Refresh Lacri-Lube   Both Eyes 6 times per day    ipratropium 0.5 mg-albuterol 2.5 mg  1 Dose Inhalation 4x Daily RT    predniSONE  40 mg Oral Daily    sodium chloride flush  5-40 mL IntraVENous 2 times per day    enoxaparin  40 mg SubCUTAneous Daily     Collected: 06/16/24 1105   Order Status: Completed Specimen: Sputum, Suctioned Updated: 06/17/24 1152    CULTURE, RESPIRATORY Further report to follow    Gram Stain Result 2+ WBC's (Polymorphonuclear) present  2+ Gram positive cocci  in chains and pairs- resembling Strep   Narrative:     ORDER#: [id number]                          ORDERED BY: FRANCIA GUEVARA  SOURCE: Sputum Suctioned                   COLLECTED:  06/16/24 11:05  ANTIBIOTICS AT [facility].:                      RECEIVED :  06/16/24 19:32   Blood Culture 2 [[id number]] Collected: 06/16/24 0455   Order Status: Completed Specimen: Blood Updated: 06/17/24 0515    Culture, Blood 2 No Growth to date.  Any change in status will be called.   Narrative:     ORDER#: [id number]                          ORDERED BY: WENDI DAVIS  SOURCE: Blood Antecubital-Lef              COLLECTED:  06/16/24 04:55  ANTIBIOTICS AT [facility].:                      RECEIVED :  06/16/24 19:30  If child <=2 yrs old please draw pediatric bottle.~Blood Culture #2   Blood Culture 1 [[id number]] Collected: 06/16/24 0455   Order Status: Completed Specimen: Blood Updated: 06/17/24 0515    Blood Culture, Routine No Growth to date.  Any change in status will be called.   Narrative:     ORDER#: [id number]                          ORDERED BY: WENDI DAVIS  SOURCE: Blood Hand, Right                  COLLECTED:  06/16/24 04:55  ANTIBIOTICS AT [facility].:                      RECEIVED :  06/16/24 19:30  If child <=2 yrs old please draw pediatric bottle.~Blood Culture 1       Films:  MRI 6/17/24    IMPRESSION:  Abnormal signal involving the basal ganglia bilaterally, the thalami  bilaterally, the frontal lobes, parietal lobes, and occipital lobes  bilaterally as well as the cerebellum consistent with findings of an acute  anoxic ischemic injury.     The findings were sent to the Radiology [facility] at 1:32  pm on 6/17/2024 to be communicated to a licensed caregiver.    CXR  anoxic brain injury. GOC discussions started with family.     Ppx/De/Lines  - PIV  - De  - Pepcid for GI ppx, Lovenox for DVT ppx    Critical care time spent reviewing labs/films, examining patient, collaborating with other physicians but excluding procedures for life threatening organ failure is 43 minutes.        Electronically signed by:  Richar Conrad MD    6/18/2024    7:03 AM.

## 2024-06-19 PROCEDURE — 6360000002 HC RX W HCPCS: Performed by: NURSE PRACTITIONER

## 2024-06-19 PROCEDURE — 2000000000 HC ICU R&B

## 2024-06-19 RX ADMIN — MORPHINE SULFATE 4 MG: 4 INJECTION, SOLUTION INTRAMUSCULAR; INTRAVENOUS at 21:42

## 2024-06-19 RX ADMIN — MORPHINE SULFATE 4 MG: 4 INJECTION, SOLUTION INTRAMUSCULAR; INTRAVENOUS at 10:47

## 2024-06-19 RX ADMIN — ATROPINE SULFATE 2 DROP: 10 SOLUTION/ DROPS OPHTHALMIC at 21:44

## 2024-06-19 RX ADMIN — LORAZEPAM 2 MG: 2 INJECTION INTRAMUSCULAR; INTRAVENOUS at 03:32

## 2024-06-19 RX ADMIN — LORAZEPAM 2 MG: 2 INJECTION INTRAMUSCULAR; INTRAVENOUS at 16:21

## 2024-06-19 RX ADMIN — LORAZEPAM 2 MG: 2 INJECTION INTRAMUSCULAR; INTRAVENOUS at 17:50

## 2024-06-19 RX ADMIN — MORPHINE SULFATE 4 MG: 4 INJECTION, SOLUTION INTRAMUSCULAR; INTRAVENOUS at 17:51

## 2024-06-19 RX ADMIN — MORPHINE SULFATE 4 MG: 4 INJECTION, SOLUTION INTRAMUSCULAR; INTRAVENOUS at 18:34

## 2024-06-19 RX ADMIN — LORAZEPAM 2 MG: 2 INJECTION INTRAMUSCULAR; INTRAVENOUS at 11:42

## 2024-06-19 RX ADMIN — LORAZEPAM 2 MG: 2 INJECTION INTRAMUSCULAR; INTRAVENOUS at 00:01

## 2024-06-19 RX ADMIN — LORAZEPAM 2 MG: 2 INJECTION INTRAMUSCULAR; INTRAVENOUS at 16:57

## 2024-06-19 RX ADMIN — MORPHINE SULFATE 4 MG: 4 INJECTION, SOLUTION INTRAMUSCULAR; INTRAVENOUS at 12:43

## 2024-06-19 RX ADMIN — LORAZEPAM 2 MG: 2 INJECTION INTRAMUSCULAR; INTRAVENOUS at 05:20

## 2024-06-19 RX ADMIN — LORAZEPAM 2 MG: 2 INJECTION INTRAMUSCULAR; INTRAVENOUS at 18:33

## 2024-06-19 RX ADMIN — MORPHINE SULFATE 4 MG: 4 INJECTION, SOLUTION INTRAMUSCULAR; INTRAVENOUS at 07:46

## 2024-06-19 RX ADMIN — MORPHINE SULFATE 4 MG: 4 INJECTION, SOLUTION INTRAMUSCULAR; INTRAVENOUS at 15:03

## 2024-06-19 RX ADMIN — LORAZEPAM 2 MG: 2 INJECTION INTRAMUSCULAR; INTRAVENOUS at 12:44

## 2024-06-19 RX ADMIN — LORAZEPAM 2 MG: 2 INJECTION INTRAMUSCULAR; INTRAVENOUS at 00:42

## 2024-06-19 RX ADMIN — LORAZEPAM 2 MG: 2 INJECTION INTRAMUSCULAR; INTRAVENOUS at 21:42

## 2024-06-19 RX ADMIN — MORPHINE SULFATE 4 MG: 4 INJECTION, SOLUTION INTRAMUSCULAR; INTRAVENOUS at 05:20

## 2024-06-19 RX ADMIN — LORAZEPAM 2 MG: 2 INJECTION INTRAMUSCULAR; INTRAVENOUS at 23:50

## 2024-06-19 RX ADMIN — LORAZEPAM 2 MG: 2 INJECTION INTRAMUSCULAR; INTRAVENOUS at 10:47

## 2024-06-19 RX ADMIN — MORPHINE SULFATE 4 MG: 4 INJECTION, SOLUTION INTRAMUSCULAR; INTRAVENOUS at 23:50

## 2024-06-19 RX ADMIN — LORAZEPAM 2 MG: 2 INJECTION INTRAMUSCULAR; INTRAVENOUS at 15:03

## 2024-06-19 RX ADMIN — LORAZEPAM 2 MG: 2 INJECTION INTRAMUSCULAR; INTRAVENOUS at 09:44

## 2024-06-19 RX ADMIN — MORPHINE SULFATE 4 MG: 4 INJECTION, SOLUTION INTRAMUSCULAR; INTRAVENOUS at 14:02

## 2024-06-19 RX ADMIN — MORPHINE SULFATE 4 MG: 4 INJECTION, SOLUTION INTRAMUSCULAR; INTRAVENOUS at 09:44

## 2024-06-19 RX ADMIN — MORPHINE SULFATE 4 MG: 4 INJECTION, SOLUTION INTRAMUSCULAR; INTRAVENOUS at 02:09

## 2024-06-19 RX ADMIN — MORPHINE SULFATE 4 MG: 4 INJECTION, SOLUTION INTRAMUSCULAR; INTRAVENOUS at 00:02

## 2024-06-19 RX ADMIN — MORPHINE SULFATE 4 MG: 4 INJECTION, SOLUTION INTRAMUSCULAR; INTRAVENOUS at 16:21

## 2024-06-19 RX ADMIN — MORPHINE SULFATE 4 MG: 4 INJECTION, SOLUTION INTRAMUSCULAR; INTRAVENOUS at 00:42

## 2024-06-19 RX ADMIN — LORAZEPAM 2 MG: 2 INJECTION INTRAMUSCULAR; INTRAVENOUS at 14:02

## 2024-06-19 RX ADMIN — LORAZEPAM 2 MG: 2 INJECTION INTRAMUSCULAR; INTRAVENOUS at 07:46

## 2024-06-19 RX ADMIN — MORPHINE SULFATE 4 MG: 4 INJECTION, SOLUTION INTRAMUSCULAR; INTRAVENOUS at 11:43

## 2024-06-19 RX ADMIN — MORPHINE SULFATE 4 MG: 4 INJECTION, SOLUTION INTRAMUSCULAR; INTRAVENOUS at 03:32

## 2024-06-19 RX ADMIN — LORAZEPAM 2 MG: 2 INJECTION INTRAMUSCULAR; INTRAVENOUS at 02:09

## 2024-06-19 RX ADMIN — MORPHINE SULFATE 4 MG: 4 INJECTION, SOLUTION INTRAMUSCULAR; INTRAVENOUS at 16:57

## 2024-06-19 ASSESSMENT — PAIN SCALES - GENERAL: PAINLEVEL_OUTOF10: 0

## 2024-06-19 NOTE — PROGRESS NOTES
Hospital Medicine Progress Note      Date of Admission: 6/16/2024  Hospital Day: 4    Chief Admission Complaint:  Cardiac arrest     Subjective:  no new c/o    Presenting Admission History:         63 y.o. WF with PMHx sig for severe COPD who presents with cardiac arrest due to severe COPD after shampooing dog carpets at home.  She has been having a lot of pain with giving herself injections at home over the past few days.  She was sitting on the toilet yesterday, then called for her significant other with severe sx of breathing and then she passed out on the toilet.  While awaiting EMS, she lost her pulse.  They did obtain ROSC after about 20 minutes of CPR.  Head CT neg, Chest CT shows pneumomediastinum and fractured sternum consistent with CPR as well as aspiration pneumonia.  Pt intially had HTN emergency with BP of 230/130s now down to 115/60s.  Changed to fentanyl and versed drips.  Pt started on cefepime and vanco for aspiration pneumonia.     Assessment/Plan:      Current Principal Problem:  Cardiac arrest (HCC)      Probable Anoxic Brain Injury - Brain MRDiscussed with family. Plan for terminal extubation and comfort care.      Cardiopulmonary arrest - likely due to severe COPD exacerbation. Concern for anoxic brain injury. Continue vent support, supportive care     Acute Respiratory Failure with Hypoxia, 2/2 COPD Exacerbation, likely aspiration pneumonia. Continue vent support.      Severe Sepsis 2/2 Aspiration pneumonia, POA - Continue empiric Abx     COPD Exacerbation - Continue bronchodilators, steroids, Abx.      Pneumomediastinum - likely due to CPR with fractured sternum. Monitor.      HTN emergency - Continue Cardene drip. Monitor.      Rheumatoid arthritis - on high dose steroids for now which should control sx       Physical Exam Performed:      General appearance:  No apparent distress. On vent  Respiratory:  Normal respiratory effort.   Cardiovascular:  Regular rate and rhythm.  Abdomen:

## 2024-06-19 NOTE — PLAN OF CARE
Problem: Safety - Medical Restraint  Goal: Remains free of injury from restraints (Restraint for Interference with Medical Device)  Description: INTERVENTIONS:  1. Determine that other, less restrictive measures have been tried or would not be effective before applying the restraint  2. Evaluate the patient's condition at the time of restraint application  3. Inform patient/family regarding the reason for restraint  4. Q2H: Monitor safety, psychosocial status, comfort, nutrition and hydration  6/18/2024 2234 by Timmy Juarez RN  Outcome: Progressing     Problem: Discharge Planning  Goal: Discharge to home or other facility with appropriate resources  6/18/2024 2234 by Timmy Juarez RN  Outcome: Progressing     Problem: Safety - Adult  Goal: Free from fall injury  6/18/2024 2234 by Timmy Juarez RN  Outcome: Progressing     Problem: Pain  Goal: Verbalizes/displays adequate comfort level or baseline comfort level  6/18/2024 2234 by Timmy Juarez RN  Outcome: Progressing     Problem: Skin/Tissue Integrity  Goal: Absence of new skin breakdown  Description: 1.  Monitor for areas of redness and/or skin breakdown  2.  Assess vascular access sites hourly  3.  Every 4-6 hours minimum:  Change oxygen saturation probe site  4.  Every 4-6 hours:  If on nasal continuous positive airway pressure, respiratory therapy assess nares and determine need for appliance change or resting period.  6/18/2024 2234 by Timmy Juarez RN  Outcome: Progressing     Problem: Nutrition Deficit:  Goal: Optimize nutritional status  6/18/2024 2234 by Timmy Juarez RN  Outcome: Progressing

## 2024-06-19 NOTE — PROGRESS NOTES
[facility]    Chart review, patient rounds. Patient not stable for transport at this time. Met with DARNELL Solis at bedside to discuss HOC philosophy and services. Time spent listening to events of illness, answering questions and providing emotional support.  Will is agreeable with hospice, but states is is up to patient's son. Call to Rell to discuss HOC philosophy and services. Per Rell, family is grateful for the comfort care being given by the hospital, would not want patient transported out. Discussion with GINNA Turner regarding possible partnering with hospice. Dr. Carey declines to [facility]. Thank you for the opportunity to serve this patient.     Daija Cruz RN  [phone] Office  [phone] mobile

## 2024-06-19 NOTE — CARE COORDINATION
LOS 3.  Care managed by [facility]osp [facility], Pulm.  S/P Arrest. Extubated. HOC consult pending. Family asking for IPU. Call placed to HOC liaison- msg left.  Call placed to HOC office- aware of pending consult. CM following. Yue Helms, RN     [id number] HOC met with family- plan at this time is to remain at [facility] due to current medical status- not as GIP. Re-eval tomorrow as indicated. Yue Helms, RN

## 2024-06-20 VITALS
HEART RATE: 146 BPM | OXYGEN SATURATION: 76 % | SYSTOLIC BLOOD PRESSURE: 144 MMHG | DIASTOLIC BLOOD PRESSURE: 73 MMHG | RESPIRATION RATE: 30 BRPM | HEIGHT: 63 IN | WEIGHT: 166.45 LBS | BODY MASS INDEX: 29.49 KG/M2 | TEMPERATURE: 99.9 F

## 2024-06-20 LAB
BACTERIA BLD CULT ORG #2: NORMAL
BACTERIA BLD CULT: NORMAL

## 2024-06-20 PROCEDURE — 6360000002 HC RX W HCPCS: Performed by: NURSE PRACTITIONER

## 2024-06-20 PROCEDURE — 2580000003 HC RX 258: Performed by: INTERNAL MEDICINE

## 2024-06-20 RX ADMIN — LORAZEPAM 2 MG: 2 INJECTION INTRAMUSCULAR; INTRAVENOUS at 06:36

## 2024-06-20 RX ADMIN — MORPHINE SULFATE 4 MG: 4 INJECTION, SOLUTION INTRAMUSCULAR; INTRAVENOUS at 01:04

## 2024-06-20 RX ADMIN — MORPHINE SULFATE 4 MG: 4 INJECTION, SOLUTION INTRAMUSCULAR; INTRAVENOUS at 03:18

## 2024-06-20 RX ADMIN — LORAZEPAM 2 MG: 2 INJECTION INTRAMUSCULAR; INTRAVENOUS at 05:10

## 2024-06-20 RX ADMIN — SODIUM CHLORIDE, PRESERVATIVE FREE 10 ML: 5 INJECTION INTRAVENOUS at 08:16

## 2024-06-20 RX ADMIN — LORAZEPAM 2 MG: 2 INJECTION INTRAMUSCULAR; INTRAVENOUS at 08:16

## 2024-06-20 RX ADMIN — LORAZEPAM 2 MG: 2 INJECTION INTRAMUSCULAR; INTRAVENOUS at 10:39

## 2024-06-20 RX ADMIN — MORPHINE SULFATE 4 MG: 4 INJECTION, SOLUTION INTRAMUSCULAR; INTRAVENOUS at 06:36

## 2024-06-20 RX ADMIN — MORPHINE SULFATE 4 MG: 4 INJECTION, SOLUTION INTRAMUSCULAR; INTRAVENOUS at 10:39

## 2024-06-20 RX ADMIN — MORPHINE SULFATE 4 MG: 4 INJECTION, SOLUTION INTRAMUSCULAR; INTRAVENOUS at 09:05

## 2024-06-20 RX ADMIN — LORAZEPAM 2 MG: 2 INJECTION INTRAMUSCULAR; INTRAVENOUS at 03:18

## 2024-06-20 RX ADMIN — MORPHINE SULFATE 4 MG: 4 INJECTION, SOLUTION INTRAMUSCULAR; INTRAVENOUS at 05:10

## 2024-06-20 RX ADMIN — MORPHINE SULFATE 4 MG: 4 INJECTION, SOLUTION INTRAMUSCULAR; INTRAVENOUS at 08:17

## 2024-06-20 RX ADMIN — LORAZEPAM 2 MG: 2 INJECTION INTRAMUSCULAR; INTRAVENOUS at 01:04

## 2024-06-20 RX ADMIN — LORAZEPAM 2 MG: 2 INJECTION INTRAMUSCULAR; INTRAVENOUS at 09:05

## 2024-06-20 NOTE — PLAN OF CARE
Problem: Safety - Medical Restraint  Goal: Remains free of injury from restraints (Restraint for Interference with Medical Device)  Description: INTERVENTIONS:  1. Determine that other, less restrictive measures have been tried or would not be effective before applying the restraint  2. Evaluate the patient's condition at the time of restraint application  3. Inform patient/family regarding the reason for restraint  4. Q2H: Monitor safety, psychosocial status, comfort, nutrition and hydration  Outcome: Progressing     Problem: Discharge Planning  Goal: Discharge to home or other facility with appropriate resources  Outcome: Progressing     Problem: Safety - Adult  Goal: Free from fall injury  Outcome: Progressing     Problem: Pain  Goal: Verbalizes/displays adequate comfort level or baseline comfort level  Outcome: Progressing     Problem: Skin/Tissue Integrity  Goal: Absence of new skin breakdown  Description: 1.  Monitor for areas of redness and/or skin breakdown  2.  Assess vascular access sites hourly  3.  Every 4-6 hours minimum:  Change oxygen saturation probe site  4.  Every 4-6 hours:  If on nasal continuous positive airway pressure, respiratory therapy assess nares and determine need for appliance change or resting period.  Outcome: Progressing     Problem: Nutrition Deficit:  Goal: Optimize nutritional status  Outcome: Progressing

## 2024-06-20 NOTE — PROGRESS NOTES
Pt has no apical pulse, no blood pressure and no spontaneous respirations. Verified by 2 RN's. Time of death 1045. Attending physician and consults called.

## 2024-06-20 NOTE — PROGRESS NOTES
06/20/24 1007   Encounter Summary   Encounter Overview/Reason Grief, Loss, and Adjustments;Spiritual/Emotional Needs   Service Provided For Family   Referral/Consult From Friend   Support System Significant other;Family members;Children   Last Encounter    (6/19: visit w/niece, brother, S.LEOLA Laura.  Listening, validation of grief, prayer as requested.)   Begin Time 1002   End Time  1040   Total Time Calculated 38 min   Grief, Loss, and Adjustments   Type Anticipatory Grief;Hospice;End of Life     Thank you for consulting Spiritual Health    If you would like a 's presence for emotional, spiritual, grief or comfort care,   please dial \"[phone]\" and ask for the  on-call to be paged.    For help with Advanced Care Planning, Power of  for Healthcare or Living Will forms, you may also call us directly:    [phone]) Rodri  [phone]) Malia  [phone]) Outpatient    [facility]

## 2024-06-26 NOTE — DISCHARGE SUMMARY
Hospital Medicine Discharge Summary    Patient: Caterina Chavez   : 1960     Primary Care Provider: Marcelo Bradley III, MD  Admitting Provider: Kady Flanagan MD  Discharge Provider: Kady Flanagan MD     Admit Date: 2024   Disposition:      Date of Death: 2024  Time of Death: 1045    Immediate Cause of Death: anoxic brain injury  Underlying Conditions: COPD exacerbation, aspiration pneumonia, rheumatoid arthritis, acute resp failure with hypoxia and hypercapnia  Other Contributing Conditions: cardiopulmonary arrest    Discharge Diagnoses:    Active Hospital Problems    Diagnosis     Anoxic brain injury (HCC) [G93.1]     Acute exacerbation of chronic obstructive pulmonary disease (COPD) (HCC) [J44.1]     Cardiac arrest (HCC) [I46.9]     Acute respiratory failure with hypoxia and hypercapnia (HCC) [J96.01, J96.02]     Multifocal pneumonia [J18.9]     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 WF with PMHx sig for severe COPD who presents with cardiac arrest due to severe COPD after shampooing dog carpets at home.  She has been having a lot of pain with giving herself injections at home over the past few days.  She was sitting on the toilet yesterday, then called for her significant other with severe sx of breathing and then she passed out on the toilet.  While awaiting EMS, she lost her pulse.  They did obtain ROSC after about 20 minutes of CPR.  Head CT neg, Chest CT shows pneumomediastinum and fractured sternum consistent with CPR as well as aspiration pneumonia.  Pt intially had HTN emergency with BP of 230/130s now down to